# Patient Record
Sex: MALE | Race: BLACK OR AFRICAN AMERICAN | NOT HISPANIC OR LATINO
[De-identification: names, ages, dates, MRNs, and addresses within clinical notes are randomized per-mention and may not be internally consistent; named-entity substitution may affect disease eponyms.]

---

## 2017-03-02 ENCOUNTER — APPOINTMENT (OUTPATIENT)
Dept: INTERNAL MEDICINE | Facility: CLINIC | Age: 52
End: 2017-03-02

## 2017-03-02 VITALS
SYSTOLIC BLOOD PRESSURE: 140 MMHG | WEIGHT: 290 LBS | OXYGEN SATURATION: 97 % | TEMPERATURE: 98.6 F | HEIGHT: 71.26 IN | DIASTOLIC BLOOD PRESSURE: 100 MMHG | BODY MASS INDEX: 40.15 KG/M2 | HEART RATE: 91 BPM

## 2017-03-02 DIAGNOSIS — M16.9 OSTEOARTHRITIS OF HIP, UNSPECIFIED: ICD-10-CM

## 2017-03-02 DIAGNOSIS — R20.2 PARESTHESIA OF SKIN: ICD-10-CM

## 2017-03-02 DIAGNOSIS — R73.03 PREDIABETES.: ICD-10-CM

## 2017-03-02 DIAGNOSIS — Z00.00 ENCOUNTER FOR GENERAL ADULT MEDICAL EXAMINATION W/OUT ABNORMAL FINDINGS: ICD-10-CM

## 2017-03-03 LAB
ALBUMIN SERPL ELPH-MCNC: 4.5 G/DL
ALP BLD-CCNC: 44 U/L
ALT SERPL-CCNC: 26 U/L
ANION GAP SERPL CALC-SCNC: 17 MMOL/L
AST SERPL-CCNC: 23 U/L
BASOPHILS # BLD AUTO: 0.01 K/UL
BASOPHILS NFR BLD AUTO: 0.2 %
BILIRUB SERPL-MCNC: 0.8 MG/DL
BUN SERPL-MCNC: 13 MG/DL
CALCIUM SERPL-MCNC: 10.2 MG/DL
CHLORIDE SERPL-SCNC: 102 MMOL/L
CHOLEST SERPL-MCNC: 193 MG/DL
CHOLEST/HDLC SERPL: 2.9 RATIO
CO2 SERPL-SCNC: 23 MMOL/L
CREAT SERPL-MCNC: 0.89 MG/DL
EOSINOPHIL # BLD AUTO: 0.06 K/UL
EOSINOPHIL NFR BLD AUTO: 1.2 %
GLUCOSE SERPL-MCNC: 97 MG/DL
HBA1C MFR BLD HPLC: 6.2 %
HCT VFR BLD CALC: 44.2 %
HDLC SERPL-MCNC: 66 MG/DL
HGB BLD-MCNC: 15.1 G/DL
IMM GRANULOCYTES NFR BLD AUTO: 0.2 %
LDLC SERPL CALC-MCNC: 102 MG/DL
LYMPHOCYTES # BLD AUTO: 2 K/UL
LYMPHOCYTES NFR BLD AUTO: 40.1 %
MAN DIFF?: NORMAL
MCHC RBC-ENTMCNC: 31.3 PG
MCHC RBC-ENTMCNC: 34.2 GM/DL
MCV RBC AUTO: 91.5 FL
MONOCYTES # BLD AUTO: 0.39 K/UL
MONOCYTES NFR BLD AUTO: 7.8 %
NEUTROPHILS # BLD AUTO: 2.52 K/UL
NEUTROPHILS NFR BLD AUTO: 50.5 %
PLATELET # BLD AUTO: 268 K/UL
POTASSIUM SERPL-SCNC: 4.6 MMOL/L
PROT SERPL-MCNC: 8 G/DL
RBC # BLD: 4.83 M/UL
RBC # FLD: 13.3 %
SODIUM SERPL-SCNC: 142 MMOL/L
TRIGL SERPL-MCNC: 123 MG/DL
VIT B12 SERPL-MCNC: 1052 PG/ML
WBC # FLD AUTO: 4.99 K/UL

## 2017-04-02 ENCOUNTER — FORM ENCOUNTER (OUTPATIENT)
Age: 52
End: 2017-04-02

## 2017-04-03 ENCOUNTER — OUTPATIENT (OUTPATIENT)
Dept: OUTPATIENT SERVICES | Facility: HOSPITAL | Age: 52
LOS: 1 days | End: 2017-04-03
Payer: COMMERCIAL

## 2017-04-03 ENCOUNTER — APPOINTMENT (OUTPATIENT)
Dept: ORTHOPEDIC SURGERY | Facility: CLINIC | Age: 52
End: 2017-04-03

## 2017-04-03 VITALS
SYSTOLIC BLOOD PRESSURE: 110 MMHG | DIASTOLIC BLOOD PRESSURE: 76 MMHG | WEIGHT: 290 LBS | HEIGHT: 71 IN | BODY MASS INDEX: 40.6 KG/M2

## 2017-04-03 DIAGNOSIS — M16.12 UNILATERAL PRIMARY OSTEOARTHRITIS, LEFT HIP: ICD-10-CM

## 2017-04-03 DIAGNOSIS — E78.00 PURE HYPERCHOLESTEROLEMIA, UNSPECIFIED: ICD-10-CM

## 2017-04-03 DIAGNOSIS — Z87.39 PERSONAL HISTORY OF OTHER DISEASES OF THE MUSCULOSKELETAL SYSTEM AND CONNECTIVE TISSUE: ICD-10-CM

## 2017-04-03 PROCEDURE — 72020 X-RAY EXAM OF SPINE 1 VIEW: CPT

## 2017-04-03 PROCEDURE — 73502 X-RAY EXAM HIP UNI 2-3 VIEWS: CPT

## 2017-04-03 PROCEDURE — 73502 X-RAY EXAM HIP UNI 2-3 VIEWS: CPT | Mod: 26,LT

## 2017-04-03 PROCEDURE — 72020 X-RAY EXAM OF SPINE 1 VIEW: CPT | Mod: 26

## 2017-04-03 RX ORDER — MELOXICAM 15 MG/1
15 TABLET ORAL
Qty: 30 | Refills: 1 | Status: ACTIVE | COMMUNITY
Start: 2017-04-03 | End: 1900-01-01

## 2017-04-04 ENCOUNTER — APPOINTMENT (OUTPATIENT)
Dept: GASTROENTEROLOGY | Facility: CLINIC | Age: 52
End: 2017-04-04

## 2017-04-04 VITALS
WEIGHT: 287 LBS | OXYGEN SATURATION: 96 % | TEMPERATURE: 97.9 F | SYSTOLIC BLOOD PRESSURE: 150 MMHG | HEART RATE: 66 BPM | BODY MASS INDEX: 40.18 KG/M2 | DIASTOLIC BLOOD PRESSURE: 100 MMHG | HEIGHT: 71 IN | RESPIRATION RATE: 16 BRPM

## 2017-04-12 ENCOUNTER — APPOINTMENT (OUTPATIENT)
Dept: UROLOGY | Facility: CLINIC | Age: 52
End: 2017-04-12

## 2017-04-12 VITALS
HEART RATE: 80 BPM | HEIGHT: 71 IN | DIASTOLIC BLOOD PRESSURE: 78 MMHG | BODY MASS INDEX: 40.18 KG/M2 | WEIGHT: 287 LBS | SYSTOLIC BLOOD PRESSURE: 134 MMHG | TEMPERATURE: 99 F

## 2017-04-12 DIAGNOSIS — R31.9 HEMATURIA, UNSPECIFIED: ICD-10-CM

## 2017-04-13 LAB
APPEARANCE: ABNORMAL
BACTERIA UR CULT: ABNORMAL
BACTERIA: NEGATIVE
BILIRUBIN URINE: NEGATIVE
BLOOD URINE: NEGATIVE
COLOR: YELLOW
GLUCOSE QUALITATIVE U: NORMAL MG/DL
HYALINE CASTS: 2 /LPF
KETONES URINE: ABNORMAL
LEUKOCYTE ESTERASE URINE: NEGATIVE
MICROSCOPIC-UA: NORMAL
NITRITE URINE: NEGATIVE
PH URINE: 5.5
PROTEIN URINE: NEGATIVE MG/DL
PSA FREE FLD-MCNC: 21 %
PSA FREE SERPL-MCNC: 0.54 NG/ML
PSA SERPL-MCNC: 2.58 NG/ML
RED BLOOD CELLS URINE: 1 /HPF
SPECIFIC GRAVITY URINE: 1.03
SQUAMOUS EPITHELIAL CELLS: 3 /HPF
UROBILINOGEN URINE: 1 MG/DL
WHITE BLOOD CELLS URINE: 2 /HPF

## 2017-05-10 ENCOUNTER — APPOINTMENT (OUTPATIENT)
Dept: UROLOGY | Facility: CLINIC | Age: 52
End: 2017-05-10

## 2017-07-08 ENCOUNTER — EMERGENCY (EMERGENCY)
Facility: HOSPITAL | Age: 52
LOS: 1 days | Discharge: HOME | End: 2017-07-08
Attending: EMERGENCY MEDICINE | Admitting: STUDENT IN AN ORGANIZED HEALTH CARE EDUCATION/TRAINING PROGRAM
Payer: COMMERCIAL

## 2017-07-08 VITALS
RESPIRATION RATE: 17 BRPM | HEART RATE: 82 BPM | SYSTOLIC BLOOD PRESSURE: 153 MMHG | TEMPERATURE: 100 F | OXYGEN SATURATION: 96 % | DIASTOLIC BLOOD PRESSURE: 94 MMHG | WEIGHT: 285.06 LBS | HEIGHT: 72 IN

## 2017-07-08 VITALS
TEMPERATURE: 101 F | DIASTOLIC BLOOD PRESSURE: 83 MMHG | SYSTOLIC BLOOD PRESSURE: 145 MMHG | OXYGEN SATURATION: 97 % | HEART RATE: 84 BPM | RESPIRATION RATE: 18 BRPM

## 2017-07-08 DIAGNOSIS — N12 TUBULO-INTERSTITIAL NEPHRITIS, NOT SPECIFIED AS ACUTE OR CHRONIC: ICD-10-CM

## 2017-07-08 DIAGNOSIS — R30.0 DYSURIA: ICD-10-CM

## 2017-07-08 LAB
ALBUMIN SERPL ELPH-MCNC: 4 G/DL — SIGNIFICANT CHANGE UP (ref 3.3–5)
ALP SERPL-CCNC: 42 U/L — SIGNIFICANT CHANGE UP (ref 40–120)
ALT FLD-CCNC: 20 U/L — SIGNIFICANT CHANGE UP (ref 10–45)
ANION GAP SERPL CALC-SCNC: 12 MMOL/L — SIGNIFICANT CHANGE UP (ref 5–17)
APPEARANCE UR: CLEAR — SIGNIFICANT CHANGE UP
AST SERPL-CCNC: 25 U/L — SIGNIFICANT CHANGE UP (ref 10–40)
BASOPHILS NFR BLD AUTO: 0.1 % — SIGNIFICANT CHANGE UP (ref 0–2)
BILIRUB SERPL-MCNC: 0.8 MG/DL — SIGNIFICANT CHANGE UP (ref 0.2–1.2)
BILIRUB UR-MCNC: NEGATIVE — SIGNIFICANT CHANGE UP
BUN SERPL-MCNC: 9 MG/DL — SIGNIFICANT CHANGE UP (ref 7–23)
CALCIUM SERPL-MCNC: 8.8 MG/DL — SIGNIFICANT CHANGE UP (ref 8.4–10.5)
CHLORIDE SERPL-SCNC: 99 MMOL/L — SIGNIFICANT CHANGE UP (ref 96–108)
CO2 SERPL-SCNC: 25 MMOL/L — SIGNIFICANT CHANGE UP (ref 22–31)
COLOR SPEC: YELLOW — SIGNIFICANT CHANGE UP
CREAT SERPL-MCNC: 0.9 MG/DL — SIGNIFICANT CHANGE UP (ref 0.5–1.3)
DIFF PNL FLD: (no result)
EOSINOPHIL NFR BLD AUTO: 0.4 % — SIGNIFICANT CHANGE UP (ref 0–6)
GLUCOSE SERPL-MCNC: 101 MG/DL — HIGH (ref 70–99)
GLUCOSE UR QL: NEGATIVE — SIGNIFICANT CHANGE UP
HCT VFR BLD CALC: 40.6 % — SIGNIFICANT CHANGE UP (ref 39–50)
HGB BLD-MCNC: 14.6 G/DL — SIGNIFICANT CHANGE UP (ref 13–17)
KETONES UR-MCNC: NEGATIVE — SIGNIFICANT CHANGE UP
LEUKOCYTE ESTERASE UR-ACNC: (no result)
LYMPHOCYTES # BLD AUTO: 18.2 % — SIGNIFICANT CHANGE UP (ref 13–44)
MCHC RBC-ENTMCNC: 31.9 PG — SIGNIFICANT CHANGE UP (ref 27–34)
MCHC RBC-ENTMCNC: 36 G/DL — SIGNIFICANT CHANGE UP (ref 32–36)
MCV RBC AUTO: 88.8 FL — SIGNIFICANT CHANGE UP (ref 80–100)
MONOCYTES NFR BLD AUTO: 10 % — SIGNIFICANT CHANGE UP (ref 2–14)
NEUTROPHILS NFR BLD AUTO: 71.3 % — SIGNIFICANT CHANGE UP (ref 43–77)
NITRITE UR-MCNC: POSITIVE
PH UR: 5.5 — SIGNIFICANT CHANGE UP (ref 5–8)
PLATELET # BLD AUTO: 182 K/UL — SIGNIFICANT CHANGE UP (ref 150–400)
POTASSIUM SERPL-MCNC: 3.9 MMOL/L — SIGNIFICANT CHANGE UP (ref 3.5–5.3)
POTASSIUM SERPL-SCNC: 3.9 MMOL/L — SIGNIFICANT CHANGE UP (ref 3.5–5.3)
PROT SERPL-MCNC: 7.9 G/DL — SIGNIFICANT CHANGE UP (ref 6–8.3)
PROT UR-MCNC: 30 MG/DL
RBC # BLD: 4.57 M/UL — SIGNIFICANT CHANGE UP (ref 4.2–5.8)
RBC # FLD: 12.8 % — SIGNIFICANT CHANGE UP (ref 10.3–16.9)
SODIUM SERPL-SCNC: 136 MMOL/L — SIGNIFICANT CHANGE UP (ref 135–145)
SP GR SPEC: 1.02 — SIGNIFICANT CHANGE UP (ref 1–1.03)
UROBILINOGEN FLD QL: 1 E.U./DL — SIGNIFICANT CHANGE UP
WBC # BLD: 6.8 K/UL — SIGNIFICANT CHANGE UP (ref 3.8–10.5)
WBC # FLD AUTO: 6.8 K/UL — SIGNIFICANT CHANGE UP (ref 3.8–10.5)

## 2017-07-08 PROCEDURE — 87186 SC STD MICRODIL/AGAR DIL: CPT

## 2017-07-08 PROCEDURE — 85025 COMPLETE CBC W/AUTO DIFF WBC: CPT

## 2017-07-08 PROCEDURE — 99284 EMERGENCY DEPT VISIT MOD MDM: CPT

## 2017-07-08 PROCEDURE — 80053 COMPREHEN METABOLIC PANEL: CPT

## 2017-07-08 PROCEDURE — 99284 EMERGENCY DEPT VISIT MOD MDM: CPT | Mod: 25

## 2017-07-08 PROCEDURE — 96374 THER/PROPH/DIAG INJ IV PUSH: CPT

## 2017-07-08 PROCEDURE — 87086 URINE CULTURE/COLONY COUNT: CPT

## 2017-07-08 PROCEDURE — 81001 URINALYSIS AUTO W/SCOPE: CPT

## 2017-07-08 PROCEDURE — 36415 COLL VENOUS BLD VENIPUNCTURE: CPT

## 2017-07-08 PROCEDURE — 96375 TX/PRO/DX INJ NEW DRUG ADDON: CPT

## 2017-07-08 RX ORDER — SODIUM CHLORIDE 9 MG/ML
1000 INJECTION INTRAMUSCULAR; INTRAVENOUS; SUBCUTANEOUS ONCE
Qty: 0 | Refills: 0 | Status: COMPLETED | OUTPATIENT
Start: 2017-07-08 | End: 2017-07-08

## 2017-07-08 RX ORDER — KETOROLAC TROMETHAMINE 30 MG/ML
30 SYRINGE (ML) INJECTION ONCE
Qty: 0 | Refills: 0 | Status: DISCONTINUED | OUTPATIENT
Start: 2017-07-08 | End: 2017-07-08

## 2017-07-08 RX ORDER — CEPHALEXIN 500 MG
1 CAPSULE ORAL
Qty: 20 | Refills: 0 | OUTPATIENT
Start: 2017-07-08 | End: 2017-07-18

## 2017-07-08 RX ORDER — ACETAMINOPHEN 500 MG
650 TABLET ORAL ONCE
Qty: 0 | Refills: 0 | Status: COMPLETED | OUTPATIENT
Start: 2017-07-08 | End: 2017-07-08

## 2017-07-08 RX ORDER — CEFTRIAXONE 500 MG/1
1 INJECTION, POWDER, FOR SOLUTION INTRAMUSCULAR; INTRAVENOUS ONCE
Qty: 0 | Refills: 0 | Status: COMPLETED | OUTPATIENT
Start: 2017-07-08 | End: 2017-07-08

## 2017-07-08 RX ORDER — ONDANSETRON 8 MG/1
4 TABLET, FILM COATED ORAL ONCE
Qty: 0 | Refills: 0 | Status: COMPLETED | OUTPATIENT
Start: 2017-07-08 | End: 2017-07-08

## 2017-07-08 RX ADMIN — SODIUM CHLORIDE 2000 MILLILITER(S): 9 INJECTION INTRAMUSCULAR; INTRAVENOUS; SUBCUTANEOUS at 15:27

## 2017-07-08 RX ADMIN — SODIUM CHLORIDE 2000 MILLILITER(S): 9 INJECTION INTRAMUSCULAR; INTRAVENOUS; SUBCUTANEOUS at 14:11

## 2017-07-08 RX ADMIN — Medication 650 MILLIGRAM(S): at 15:27

## 2017-07-08 RX ADMIN — CEFTRIAXONE 100 GRAM(S): 500 INJECTION, POWDER, FOR SOLUTION INTRAMUSCULAR; INTRAVENOUS at 15:08

## 2017-07-08 RX ADMIN — Medication 30 MILLIGRAM(S): at 16:18

## 2017-07-08 NOTE — ED PROVIDER NOTE - OBJECTIVE STATEMENT
LS 50 yo m w/ pmh htn presents to ed c/o of dysuria, urinary urgency, frequency and hematuria for the past few days.  Pt also reports associated low back pain.  Pt reports only being sexually active with one partner and never having any std's.  Pt otherwise denies abdominal pain, nausea, vomiting, LS 50 yo m w/ pmh htn presents to ed c/o of dysuria, urinary urgency, frequency and hematuria for the past few days.  Pt also reports associated low back pain and nausea.  Pt reports only being sexually active with one partner and never having any std's.  Pt otherwise denies abdominal pain, vomiting, diarrhea LS 50 yo m w/ pmh htn presents to ed c/o of dysuria, urinary urgency, frequency and hematuria for the past few days.  Pt also reports associated low back pain and nausea.  Pt reports only being sexually active with one partner and never having any std's.  Pt otherwise denies abdominal pain, vomiting, diarrhea.

## 2017-07-08 NOTE — ED ADULT NURSE NOTE - OBJECTIVE STATEMENT
Pt CO Hematuria and Lower Back Pain x3 days.  Pt states "This happened a few years ago because I had an infection."  PT denies N/V/D, SOB, Fevers at this time.

## 2017-07-08 NOTE — ED PROVIDER NOTE - ATTENDING CONTRIBUTION TO CARE
52 yo M with hx of HTN presents to ED c/o of dysuria, urgency, frequency and hematuria for the past few days.  (+) nausea.  Sexually active with one partner and no hx of STIs. Denies abdominal pain, vomiting, diarrhea, penile discharge. No fevers/ chills. Pt with hx of UTI with similar sxs in the past. Pt AAO, NAD, RRR, CTA b/l, abd: soft/NT, No CVAT b/l, labs noted. Pt treated for UTI. Rx given.

## 2017-07-08 NOTE — ED PROVIDER NOTE - MEDICAL DECISION MAKING DETAILS
Case d/w Dr. ansari, LS 50 yo m w/ pmh htn presents to ed c/o of dysuria, urinary urgency, frequency and hematuria for the past few days. pt with low back pain rest of exam unremarkable.  U/a + for UTI pt with fever, likely early pyelo.  Pt given IVF's, tylenol, zofran and first dose of ceftriaxone, plan to reasses. Case d/w Dr. ansari, LS 50 yo m w/ pmh htn presents to ed c/o of dysuria, urinary urgency, frequency and hematuria for the past few days. pt with low back pain rest of exam unremarkable.  U/a + for UTI pt with fever, likely early pyelo.  Pt given IVF's, tylenol, zofran and first dose of ceftriaxone, plan to reasses. Pt feeling much improved and stable for discharge at this time

## 2017-07-10 LAB
-  AMPICILLIN/SULBACTAM: SIGNIFICANT CHANGE UP
-  AMPICILLIN: SIGNIFICANT CHANGE UP
-  CEFAZOLIN: SIGNIFICANT CHANGE UP
-  CEFTRIAXONE: SIGNIFICANT CHANGE UP
-  CIPROFLOXACIN: SIGNIFICANT CHANGE UP
-  GENTAMICIN: SIGNIFICANT CHANGE UP
-  NITROFURANTOIN: SIGNIFICANT CHANGE UP
-  PIPERACILLIN/TAZOBACTAM: SIGNIFICANT CHANGE UP
-  TOBRAMYCIN: SIGNIFICANT CHANGE UP
-  TRIMETHOPRIM/SULFAMETHOXAZOLE: SIGNIFICANT CHANGE UP
CULTURE RESULTS: SIGNIFICANT CHANGE UP
METHOD TYPE: SIGNIFICANT CHANGE UP
ORGANISM # SPEC MICROSCOPIC CNT: SIGNIFICANT CHANGE UP
ORGANISM # SPEC MICROSCOPIC CNT: SIGNIFICANT CHANGE UP
SPECIMEN SOURCE: SIGNIFICANT CHANGE UP

## 2018-03-05 ENCOUNTER — APPOINTMENT (OUTPATIENT)
Dept: INTERNAL MEDICINE | Facility: CLINIC | Age: 53
End: 2018-03-05

## 2018-05-14 ENCOUNTER — LABORATORY RESULT (OUTPATIENT)
Age: 53
End: 2018-05-14

## 2018-05-14 ENCOUNTER — APPOINTMENT (OUTPATIENT)
Dept: INTERNAL MEDICINE | Facility: CLINIC | Age: 53
End: 2018-05-14
Payer: MEDICAID

## 2018-05-14 VITALS
HEIGHT: 71 IN | OXYGEN SATURATION: 96 % | TEMPERATURE: 99.1 F | HEART RATE: 71 BPM | BODY MASS INDEX: 39.62 KG/M2 | SYSTOLIC BLOOD PRESSURE: 147 MMHG | WEIGHT: 283 LBS | DIASTOLIC BLOOD PRESSURE: 93 MMHG

## 2018-05-14 DIAGNOSIS — M25.559 PAIN IN UNSPECIFIED HIP: ICD-10-CM

## 2018-05-14 DIAGNOSIS — R31.9 HEMATURIA, UNSPECIFIED: ICD-10-CM

## 2018-05-14 DIAGNOSIS — Z87.898 PERSONAL HISTORY OF OTHER SPECIFIED CONDITIONS: ICD-10-CM

## 2018-05-14 DIAGNOSIS — E78.5 HYPERLIPIDEMIA, UNSPECIFIED: ICD-10-CM

## 2018-05-14 DIAGNOSIS — K21.9 GASTRO-ESOPHAGEAL REFLUX DISEASE W/OUT ESOPHAGITIS: ICD-10-CM

## 2018-05-14 PROCEDURE — 93000 ELECTROCARDIOGRAM COMPLETE: CPT

## 2018-05-14 PROCEDURE — 36415 COLL VENOUS BLD VENIPUNCTURE: CPT

## 2018-05-14 PROCEDURE — 99396 PREV VISIT EST AGE 40-64: CPT | Mod: 25

## 2018-05-14 RX ORDER — SILDENAFIL 100 MG/1
100 TABLET, FILM COATED ORAL
Qty: 9 | Refills: 0 | Status: ACTIVE | COMMUNITY
Start: 2017-04-12 | End: 1900-01-01

## 2018-05-14 RX ORDER — PANTOPRAZOLE 40 MG/1
40 TABLET, DELAYED RELEASE ORAL DAILY
Qty: 90 | Refills: 0 | Status: ACTIVE | COMMUNITY
Start: 2018-05-14 | End: 1900-01-01

## 2018-05-15 LAB
APPEARANCE: ABNORMAL
BACTERIA: NEGATIVE
BILIRUBIN URINE: NEGATIVE
BLOOD URINE: NEGATIVE
COLOR: YELLOW
GLUCOSE QUALITATIVE U: NEGATIVE MG/DL
HYALINE CASTS: 2 /LPF
KETONES URINE: NEGATIVE
LEUKOCYTE ESTERASE URINE: ABNORMAL
MICROSCOPIC-UA: NORMAL
NITRITE URINE: NEGATIVE
PH URINE: 5
PROTEIN URINE: NEGATIVE MG/DL
RED BLOOD CELLS URINE: 2 /HPF
SPECIFIC GRAVITY URINE: 1.03
SQUAMOUS EPITHELIAL CELLS: 3 /HPF
URIC ACID CRYSTALS: ABNORMAL
URINE COMMENTS: NORMAL
UROBILINOGEN URINE: NEGATIVE MG/DL
WHITE BLOOD CELLS URINE: 27 /HPF

## 2018-05-15 RX ORDER — ATORVASTATIN CALCIUM 40 MG/1
40 TABLET, FILM COATED ORAL
Qty: 30 | Refills: 3 | Status: ACTIVE | COMMUNITY
Start: 2018-05-15 | End: 1900-01-01

## 2018-05-15 NOTE — HISTORY OF PRESENT ILLNESS
[de-identified] : Called patient about his abnormal labs.  Patient with elevated lymphocytes, patient confirmed he was getting over a viral illness.  Will repeat in 3 weeks.  Patient will make appointment.  Patient additionally with elevated cholesterol elevated ASCVD 11%.  Started statin.  Told patient A1C downtrending no need for medication.  Patient creatinine normal started lisinopril 5mg.  Patient to follow up in clinic in 3 weeks. Patient for repeat urine studies patient with pyuria.

## 2018-05-16 ENCOUNTER — OTHER (OUTPATIENT)
Age: 53
End: 2018-05-16

## 2018-05-16 LAB
ALBUMIN SERPL ELPH-MCNC: 4.4 G/DL
ALP BLD-CCNC: 36 U/L
ALT SERPL-CCNC: 22 U/L
ANION GAP SERPL CALC-SCNC: 12 MMOL/L
AST SERPL-CCNC: 19 U/L
BASOPHILS # BLD AUTO: 0 K/UL
BASOPHILS NFR BLD AUTO: 0 %
BILIRUB SERPL-MCNC: 0.4 MG/DL
BUN SERPL-MCNC: 14 MG/DL
CALCIUM SERPL-MCNC: 9.6 MG/DL
CHLORIDE SERPL-SCNC: 103 MMOL/L
CHOLEST SERPL-MCNC: 174 MG/DL
CHOLEST/HDLC SERPL: 2.6 RATIO
CO2 SERPL-SCNC: 28 MMOL/L
CREAT SERPL-MCNC: 0.8 MG/DL
CREAT SPEC-SCNC: 261 MG/DL
EOSINOPHIL # BLD AUTO: 0.12 K/UL
EOSINOPHIL NFR BLD AUTO: 3 %
GLUCOSE SERPL-MCNC: 96 MG/DL
HBA1C MFR BLD HPLC: 6 %
HCT VFR BLD CALC: 43.1 %
HDLC SERPL-MCNC: 68 MG/DL
HGB BLD-MCNC: 14.3 G/DL
LDLC SERPL CALC-MCNC: 80 MG/DL
LYMPHOCYTES # BLD AUTO: 2.55 K/UL
LYMPHOCYTES NFR BLD AUTO: 66 %
MAN DIFF?: NORMAL
MCHC RBC-ENTMCNC: 31.4 PG
MCHC RBC-ENTMCNC: 33.2 GM/DL
MCV RBC AUTO: 94.5 FL
MICROALBUMIN 24H UR DL<=1MG/L-MCNC: 1.4 MG/DL
MICROALBUMIN/CREAT 24H UR-RTO: 5 MG/G
MONOCYTES # BLD AUTO: 0.43 K/UL
MONOCYTES NFR BLD AUTO: 11 %
NEUTROPHILS # BLD AUTO: 0.77 K/UL
NEUTROPHILS NFR BLD AUTO: 20 %
PLATELET # BLD AUTO: 235 K/UL
POTASSIUM SERPL-SCNC: 4.5 MMOL/L
PROT SERPL-MCNC: 7.8 G/DL
RBC # BLD: 4.56 M/UL
RBC # FLD: 13.3 %
SODIUM SERPL-SCNC: 143 MMOL/L
TRIGL SERPL-MCNC: 131 MG/DL
VIT B12 SERPL-MCNC: 749 PG/ML
WBC # FLD AUTO: 3.87 K/UL

## 2018-05-30 ENCOUNTER — APPOINTMENT (OUTPATIENT)
Dept: HEART AND VASCULAR | Facility: CLINIC | Age: 53
End: 2018-05-30
Payer: MEDICAID

## 2018-05-30 ENCOUNTER — APPOINTMENT (OUTPATIENT)
Dept: HEART AND VASCULAR | Facility: CLINIC | Age: 53
End: 2018-05-30

## 2018-05-30 VITALS
HEART RATE: 62 BPM | BODY MASS INDEX: 39.2 KG/M2 | DIASTOLIC BLOOD PRESSURE: 90 MMHG | SYSTOLIC BLOOD PRESSURE: 138 MMHG | WEIGHT: 280 LBS | HEIGHT: 71 IN

## 2018-05-30 DIAGNOSIS — I10 ESSENTIAL (PRIMARY) HYPERTENSION: ICD-10-CM

## 2018-05-30 DIAGNOSIS — Z86.39 PERSONAL HISTORY OF OTHER ENDOCRINE, NUTRITIONAL AND METABOLIC DISEASE: ICD-10-CM

## 2018-05-30 DIAGNOSIS — R06.09 OTHER FORMS OF DYSPNEA: ICD-10-CM

## 2018-05-30 PROCEDURE — 93306 TTE W/DOPPLER COMPLETE: CPT

## 2018-05-30 PROCEDURE — 99214 OFFICE O/P EST MOD 30 MIN: CPT | Mod: 25

## 2018-05-30 RX ORDER — LISINOPRIL 10 MG/1
10 TABLET ORAL DAILY
Qty: 90 | Refills: 3 | Status: ACTIVE | COMMUNITY
Start: 2018-05-15 | End: 1900-01-01

## 2018-05-30 RX ORDER — AMLODIPINE BESYLATE 5 MG/1
5 TABLET ORAL DAILY
Qty: 30 | Refills: 0 | Status: COMPLETED | COMMUNITY
Start: 2017-03-02 | End: 2018-05-30

## 2018-06-19 ENCOUNTER — APPOINTMENT (OUTPATIENT)
Dept: HEART AND VASCULAR | Facility: CLINIC | Age: 53
End: 2018-06-19

## 2018-06-26 ENCOUNTER — APPOINTMENT (OUTPATIENT)
Dept: INTERNAL MEDICINE | Facility: CLINIC | Age: 53
End: 2018-06-26
Payer: MEDICAID

## 2018-06-26 VITALS
HEART RATE: 79 BPM | HEIGHT: 71 IN | OXYGEN SATURATION: 97 % | BODY MASS INDEX: 40.6 KG/M2 | DIASTOLIC BLOOD PRESSURE: 99 MMHG | TEMPERATURE: 99 F | WEIGHT: 290 LBS | SYSTOLIC BLOOD PRESSURE: 149 MMHG

## 2018-06-26 DIAGNOSIS — M54.9 DORSALGIA, UNSPECIFIED: ICD-10-CM

## 2018-06-26 DIAGNOSIS — R20.0 ANESTHESIA OF SKIN: ICD-10-CM

## 2018-06-26 DIAGNOSIS — M77.50 OTHER ENTHESOPATHY OF UNSPCFD FOOT AND ANKLE: ICD-10-CM

## 2018-06-26 PROCEDURE — 99213 OFFICE O/P EST LOW 20 MIN: CPT | Mod: GC

## 2018-06-27 ENCOUNTER — APPOINTMENT (OUTPATIENT)
Dept: HEART AND VASCULAR | Facility: CLINIC | Age: 53
End: 2018-06-27

## 2018-08-24 ENCOUNTER — APPOINTMENT (OUTPATIENT)
Dept: INTERNAL MEDICINE | Facility: CLINIC | Age: 53
End: 2018-08-24

## 2018-09-14 ENCOUNTER — APPOINTMENT (OUTPATIENT)
Dept: NEUROLOGY | Facility: CLINIC | Age: 53
End: 2018-09-14

## 2019-01-16 NOTE — ED PROVIDER NOTE - CROS ED ROS STATEMENT
Patient wanted the  referral to rheumatology to be changed to the [place] area.     Patient advised to call his insurance company and see which doctor will be covered and call us   Dr. Freitas's office who referred rheumatology.    Patient verbalized understanding.     all other ROS negative except as per HPI

## 2019-07-16 ENCOUNTER — APPOINTMENT (OUTPATIENT)
Dept: UROLOGY | Facility: CLINIC | Age: 54
End: 2019-07-16

## 2019-07-26 ENCOUNTER — APPOINTMENT (OUTPATIENT)
Dept: UROLOGY | Facility: CLINIC | Age: 54
End: 2019-07-26

## 2019-08-12 ENCOUNTER — APPOINTMENT (OUTPATIENT)
Dept: UROLOGY | Facility: CLINIC | Age: 54
End: 2019-08-12
Payer: MEDICAID

## 2019-08-12 VITALS — TEMPERATURE: 99.2 F | DIASTOLIC BLOOD PRESSURE: 95 MMHG | SYSTOLIC BLOOD PRESSURE: 148 MMHG | HEART RATE: 76 BPM

## 2019-08-12 PROCEDURE — 99213 OFFICE O/P EST LOW 20 MIN: CPT

## 2019-08-13 LAB
APPEARANCE: ABNORMAL
BACTERIA: ABNORMAL
BILIRUBIN URINE: NEGATIVE
BLOOD URINE: NEGATIVE
COLOR: YELLOW
GLUCOSE QUALITATIVE U: NEGATIVE
HYALINE CASTS: 2 /LPF
KETONES URINE: NEGATIVE
LEUKOCYTE ESTERASE URINE: ABNORMAL
MICROSCOPIC-UA: NORMAL
NITRITE URINE: NEGATIVE
PH URINE: 7
PROTEIN URINE: NORMAL
RED BLOOD CELLS URINE: 4 /HPF
SPECIFIC GRAVITY URINE: 1.02
SQUAMOUS EPITHELIAL CELLS: 19 /HPF
UROBILINOGEN URINE: NORMAL
WHITE BLOOD CELLS URINE: 81 /HPF

## 2019-08-13 NOTE — PHYSICAL EXAM
[General Appearance - Well Developed] : well developed [General Appearance - Well Nourished] : well nourished [Well Groomed] : well groomed [Normal Appearance] : normal appearance [General Appearance - In No Acute Distress] : no acute distress [Abdomen Soft] : soft [Abdomen Tenderness] : non-tender [Costovertebral Angle Tenderness] : no ~M costovertebral angle tenderness [Urinary Bladder Findings] : the bladder was normal on palpation [Scrotum] : the scrotum was normal [Epididymis] : the epididymides were normal [Testes Tenderness] : no tenderness of the testes [Testes Mass (___cm)] : there were no testicular masses [FreeTextEntry1] : meatal stenosis with discoloration and fibrosis  [Oriented To Time, Place, And Person] : oriented to person, place, and time [Affect] : the affect was normal [Mood] : the mood was normal [Not Anxious] : not anxious [Normal Station and Gait] : the gait and station were normal for the patient's age [No Focal Deficits] : no focal deficits

## 2019-08-13 NOTE — LETTER BODY
[Dear  ___] : Dear  [unfilled], [Courtesy Letter:] : I had the pleasure of seeing your patient, [unfilled], in my office today. [Please see my note below.] : Please see my note below. [Consult Closing:] : Thank you very much for allowing me to participate in the care of this patient.  If you have any questions, please do not hesitate to contact me. [Sincerely,] : Sincerely, [FreeTextEntry2] : Yoshi Benson MD\par [address], [address]\[place], [address] [FreeTextEntry3] : Esteban Gaston MD\par Urologic Oncology\par Department of Urology\par [facility]\par \par [employer] [facility] \par \par

## 2019-08-13 NOTE — ASSESSMENT
[FreeTextEntry1] : 53yo male with history of gross hematuria, negative workup 2016, new complaint of dysuria, ?recurrent UTI\par Noted on exam today to have meatal stenosis/fibrosis\par Recommend cystoscopy with dilation for further evaluation \par Check renal US for follow up of hematuria and recurrent UTI

## 2019-08-13 NOTE — HISTORY OF PRESENT ILLNESS
[FreeTextEntry1] : This is a 49yo male who reports gross hematuria, weak urinary stream and dysuria starting several weeks ago. Symptoms have been on and off. Pain described as 5/10. History of light smoking. No flank pain, history of stones, nausea or vomiting. \par \par 4/12/17 Here for f/u. Last seen about 1 year ago for gross hematuria, had negative workup. He currently denies any voiding symptoms, no interval hematuria. His primary concern is erectile dysfunction. He has difficulty maintaining erections. He has had no prior treatment. He has several risk factors including hypertension, prediabetes, overweight. \par \par 8/12/19 Here for f/u. Last seen 2 years ago. Reports at least 2 recent UTI associated with dysuria. Took two course of antibiotics. Unclear if urine cultures were positive. Recent PSA = 1.2 [Urinary Frequency] : urinary frequency [Dysuria] : dysuria [None] : None

## 2019-08-14 ENCOUNTER — FORM ENCOUNTER (OUTPATIENT)
Age: 54
End: 2019-08-14

## 2019-08-14 LAB
BACTERIA UR CULT: NORMAL
URINE CYTOLOGY: NORMAL

## 2019-08-15 ENCOUNTER — APPOINTMENT (OUTPATIENT)
Dept: ULTRASOUND IMAGING | Facility: HOSPITAL | Age: 54
End: 2019-08-15
Payer: MEDICAID

## 2019-08-15 ENCOUNTER — OUTPATIENT (OUTPATIENT)
Dept: OUTPATIENT SERVICES | Facility: HOSPITAL | Age: 54
LOS: 1 days | End: 2019-08-15
Payer: COMMERCIAL

## 2019-08-15 PROCEDURE — 76770 US EXAM ABDO BACK WALL COMP: CPT

## 2019-08-15 PROCEDURE — 76770 US EXAM ABDO BACK WALL COMP: CPT | Mod: 26

## 2019-08-28 ENCOUNTER — APPOINTMENT (OUTPATIENT)
Dept: UROLOGY | Facility: CLINIC | Age: 54
End: 2019-08-28
Payer: MEDICAID

## 2019-08-28 VITALS — TEMPERATURE: 98.8 F | DIASTOLIC BLOOD PRESSURE: 80 MMHG | HEART RATE: 76 BPM | SYSTOLIC BLOOD PRESSURE: 123 MMHG

## 2019-08-28 DIAGNOSIS — N36.9 URETHRAL DISORDER, UNSPECIFIED: ICD-10-CM

## 2019-08-28 DIAGNOSIS — R31.0 GROSS HEMATURIA: ICD-10-CM

## 2019-08-28 PROCEDURE — 53600 DILATE URETHRA STRICTURE: CPT

## 2019-08-29 LAB
ALBUMIN SERPL ELPH-MCNC: 4.4 G/DL
ALP BLD-CCNC: 38 U/L
ALT SERPL-CCNC: 16 U/L
ANION GAP SERPL CALC-SCNC: 13 MMOL/L
APPEARANCE: CLEAR
APTT BLD: 34.2 SEC
AST SERPL-CCNC: 16 U/L
BACTERIA: NEGATIVE
BASOPHILS # BLD AUTO: 0.03 K/UL
BASOPHILS NFR BLD AUTO: 0.5 %
BILIRUB SERPL-MCNC: 0.2 MG/DL
BILIRUBIN URINE: NEGATIVE
BLOOD URINE: ABNORMAL
BUN SERPL-MCNC: 15 MG/DL
CALCIUM SERPL-MCNC: 9.8 MG/DL
CHLORIDE SERPL-SCNC: 99 MMOL/L
CO2 SERPL-SCNC: 27 MMOL/L
COLOR: YELLOW
CREAT SERPL-MCNC: 1.07 MG/DL
EOSINOPHIL # BLD AUTO: 0.07 K/UL
EOSINOPHIL NFR BLD AUTO: 1.1 %
GLUCOSE QUALITATIVE U: NEGATIVE
GLUCOSE SERPL-MCNC: 106 MG/DL
HCT VFR BLD CALC: 41.7 %
HGB BLD-MCNC: 13.7 G/DL
HYALINE CASTS: 4 /LPF
IMM GRANULOCYTES NFR BLD AUTO: 0.2 %
INR PPP: 1 RATIO
KETONES URINE: NEGATIVE
LEUKOCYTE ESTERASE URINE: ABNORMAL
LYMPHOCYTES # BLD AUTO: 2.39 K/UL
LYMPHOCYTES NFR BLD AUTO: 38.6 %
MAN DIFF?: NORMAL
MCHC RBC-ENTMCNC: 30.5 PG
MCHC RBC-ENTMCNC: 32.9 GM/DL
MCV RBC AUTO: 92.9 FL
MICROSCOPIC-UA: NORMAL
MONOCYTES # BLD AUTO: 0.63 K/UL
MONOCYTES NFR BLD AUTO: 10.2 %
NEUTROPHILS # BLD AUTO: 3.06 K/UL
NEUTROPHILS NFR BLD AUTO: 49.4 %
NITRITE URINE: NEGATIVE
PH URINE: 6
PLATELET # BLD AUTO: 255 K/UL
POTASSIUM SERPL-SCNC: 3.8 MMOL/L
PROT SERPL-MCNC: 7.4 G/DL
PROTEIN URINE: NEGATIVE
PT BLD: 11.5 SEC
RBC # BLD: 4.49 M/UL
RBC # FLD: 13.1 %
RED BLOOD CELLS URINE: 2 /HPF
SODIUM SERPL-SCNC: 139 MMOL/L
SPECIFIC GRAVITY URINE: 1.02
SQUAMOUS EPITHELIAL CELLS: 3 /HPF
UROBILINOGEN URINE: NORMAL
WBC # FLD AUTO: 6.19 K/UL
WHITE BLOOD CELLS URINE: 26 /HPF

## 2019-08-30 LAB — BACTERIA UR CULT: ABNORMAL

## 2019-09-05 ENCOUNTER — FORM ENCOUNTER (OUTPATIENT)
Age: 54
End: 2019-09-05

## 2019-09-06 ENCOUNTER — OUTPATIENT (OUTPATIENT)
Dept: OUTPATIENT SERVICES | Facility: HOSPITAL | Age: 54
LOS: 1 days | End: 2019-09-06
Payer: COMMERCIAL

## 2019-09-06 PROCEDURE — 71046 X-RAY EXAM CHEST 2 VIEWS: CPT | Mod: 26

## 2019-09-06 PROCEDURE — 71046 X-RAY EXAM CHEST 2 VIEWS: CPT

## 2019-09-07 LAB
APPEARANCE: ABNORMAL
BACTERIA UR CULT: NORMAL
BACTERIA: ABNORMAL
BILIRUBIN URINE: NEGATIVE
BLOOD URINE: NEGATIVE
COLOR: YELLOW
GLUCOSE QUALITATIVE U: NEGATIVE
KETONES URINE: NEGATIVE
LEUKOCYTE ESTERASE URINE: NEGATIVE
MICROSCOPIC-UA: NORMAL
NITRITE URINE: NEGATIVE
PH URINE: 5.5
PROTEIN URINE: NORMAL
RED BLOOD CELLS URINE: 1 /HPF
SPECIFIC GRAVITY URINE: >=1.03
SQUAMOUS EPITHELIAL CELLS: 1 /HPF
URINE COMMENTS: NORMAL
UROBILINOGEN URINE: NORMAL
WHITE BLOOD CELLS URINE: 1 /HPF

## 2019-09-10 ENCOUNTER — RESULT REVIEW (OUTPATIENT)
Age: 54
End: 2019-09-10

## 2019-09-10 ENCOUNTER — OUTPATIENT (OUTPATIENT)
Dept: OUTPATIENT SERVICES | Facility: HOSPITAL | Age: 54
LOS: 1 days | Discharge: HOME | End: 2019-09-10
Payer: MEDICAID

## 2019-09-10 ENCOUNTER — APPOINTMENT (OUTPATIENT)
Dept: UROLOGY | Facility: AMBULATORY SURGERY CENTER | Age: 54
End: 2019-09-10

## 2019-09-10 DIAGNOSIS — N35.919 UNSPECIFIED URETHRAL STRICTURE, MALE, UNSPECIFIED SITE: ICD-10-CM

## 2019-09-10 PROCEDURE — 52281 CYSTOSCOPY AND TREATMENT: CPT

## 2019-09-10 RX ORDER — CEPHALEXIN 500 MG
1 CAPSULE ORAL
Qty: 10 | Refills: 0
Start: 2019-09-10 | End: 2019-09-14

## 2019-09-11 PROBLEM — N35.919 URETHRAL MEATAL STENOSIS: Status: ACTIVE | Noted: 2019-09-11

## 2019-09-12 LAB
-  AMIKACIN: SIGNIFICANT CHANGE UP
-  AMPICILLIN/SULBACTAM: SIGNIFICANT CHANGE UP
-  AMPICILLIN: SIGNIFICANT CHANGE UP
-  CEFAZOLIN: SIGNIFICANT CHANGE UP
-  CEFTRIAXONE: SIGNIFICANT CHANGE UP
-  CIPROFLOXACIN: SIGNIFICANT CHANGE UP
-  GENTAMICIN: SIGNIFICANT CHANGE UP
-  MEROPENEM: SIGNIFICANT CHANGE UP
-  PIPERACILLIN/TAZOBACTAM: SIGNIFICANT CHANGE UP
-  TOBRAMYCIN: SIGNIFICANT CHANGE UP
-  TRIMETHOPRIM/SULFAMETHOXAZOLE: SIGNIFICANT CHANGE UP
METHOD TYPE: SIGNIFICANT CHANGE UP
SURGICAL PATHOLOGY STUDY: SIGNIFICANT CHANGE UP

## 2019-09-13 LAB
-  MEROPENEM: SIGNIFICANT CHANGE UP
CULTURE RESULTS: SIGNIFICANT CHANGE UP
METHOD TYPE: SIGNIFICANT CHANGE UP
ORGANISM # SPEC MICROSCOPIC CNT: SIGNIFICANT CHANGE UP
SPECIMEN SOURCE: SIGNIFICANT CHANGE UP

## 2019-09-23 ENCOUNTER — APPOINTMENT (OUTPATIENT)
Dept: UROLOGY | Facility: CLINIC | Age: 54
End: 2019-09-23
Payer: MEDICAID

## 2019-09-23 VITALS — DIASTOLIC BLOOD PRESSURE: 79 MMHG | HEART RATE: 71 BPM | SYSTOLIC BLOOD PRESSURE: 125 MMHG | TEMPERATURE: 98.6 F

## 2019-09-23 DIAGNOSIS — N45.1 EPIDIDYMITIS: ICD-10-CM

## 2019-09-23 PROCEDURE — 99213 OFFICE O/P EST LOW 20 MIN: CPT

## 2019-09-23 RX ORDER — IBUPROFEN 800 MG/1
800 TABLET, FILM COATED ORAL EVERY 8 HOURS
Qty: 30 | Refills: 0 | Status: ACTIVE | COMMUNITY
Start: 2019-09-23 | End: 1900-01-01

## 2019-09-23 NOTE — HISTORY OF PRESENT ILLNESS
[FreeTextEntry1] : This is a 51yo male who reports gross hematuria, weak urinary stream and dysuria starting several weeks ago. Symptoms have been on and off. Pain described as 5/10. History of light smoking. No flank pain, history of stones, nausea or vomiting. \par \par 4/12/17 Here for f/u. Last seen about 1 year ago for gross hematuria, had negative workup. He currently denies any voiding symptoms, no interval hematuria. His primary concern is erectile dysfunction. He has difficulty maintaining erections. He has had no prior treatment. He has several risk factors including hypertension, prediabetes, overweight. \par \par 8/12/19 Here for f/u. Last seen 2 years ago. Reports at least 2 recent UTI associated with dysuria. Took two course of antibiotics. Unclear if urine cultures were positive. Recent PSA = 1.2\par \par 9/23/19 Here for f/u. Underwent meatoplasty, cystoscopy 9/10. Path: BXO. Culture from OR grew MDR resistant organism. He developed mild left epididymitis postop.  [Moderate] : moderate in severity [5] : 5 [de-identified] : left testicle

## 2019-09-23 NOTE — ASSESSMENT
[FreeTextEntry1] : 55yo male with history of gross hematuria, negative workup 2016, new complaint of dysuria, ?recurrent UTI\par Noted on exam today to have meatal stenosis/fibrosis\par s/p meatoplasty 9/10\par Path reviewed: BXO\par Postop epididymitis. H/o MDR organism. Will repeat culture\par Will treat empirically with oral antibiotic for now but may need IV antibiotics\par F/u 3 weeks

## 2019-09-23 NOTE — LETTER BODY
[Dear  ___] : Dear  [unfilled], [Courtesy Letter:] : I had the pleasure of seeing your patient, [unfilled], in my office today. [Please see my note below.] : Please see my note below. [Consult Closing:] : Thank you very much for allowing me to participate in the care of this patient.  If you have any questions, please do not hesitate to contact me. [Sincerely,] : Sincerely, [FreeTextEntry3] : Esteban Gaston MD\par Urologic Oncology\par Department of Urology\par [facility]\par \par [employer] [facility] \par \par  [FreeTextEntry2] : Yoshi Benson MD\par [address], [address]\[place], [address]

## 2019-09-23 NOTE — PHYSICAL EXAM
[General Appearance - Well Developed] : well developed [General Appearance - Well Nourished] : well nourished [Well Groomed] : well groomed [Normal Appearance] : normal appearance [General Appearance - In No Acute Distress] : no acute distress [Abdomen Soft] : soft [Abdomen Tenderness] : non-tender [Costovertebral Angle Tenderness] : no ~M costovertebral angle tenderness [Urinary Bladder Findings] : the bladder was normal on palpation [FreeTextEntry1] : meatus is wide open, well healed. Left testicle tender with induration [Oriented To Time, Place, And Person] : oriented to person, place, and time [Affect] : the affect was normal [Mood] : the mood was normal [Not Anxious] : not anxious [Normal Station and Gait] : the gait and station were normal for the patient's age [No Focal Deficits] : no focal deficits

## 2019-09-25 ENCOUNTER — MESSAGE (OUTPATIENT)
Age: 54
End: 2019-09-25

## 2019-09-25 ENCOUNTER — OTHER (OUTPATIENT)
Age: 54
End: 2019-09-25

## 2019-09-25 LAB — BACTERIA UR CULT: ABNORMAL

## 2019-09-25 RX ORDER — NITROFURANTOIN MACROCRYSTALS 100 MG/1
100 CAPSULE ORAL
Qty: 20 | Refills: 0 | Status: ACTIVE | COMMUNITY
Start: 2019-09-25 | End: 1900-01-01

## 2019-10-14 ENCOUNTER — APPOINTMENT (OUTPATIENT)
Dept: UROLOGY | Facility: CLINIC | Age: 54
End: 2019-10-14
Payer: MEDICAID

## 2019-10-14 VITALS — TEMPERATURE: 99.9 F | DIASTOLIC BLOOD PRESSURE: 68 MMHG | SYSTOLIC BLOOD PRESSURE: 109 MMHG | HEART RATE: 90 BPM

## 2019-10-14 PROCEDURE — 99213 OFFICE O/P EST LOW 20 MIN: CPT

## 2019-10-14 RX ORDER — SULFAMETHOXAZOLE AND TRIMETHOPRIM 800; 160 MG/1; MG/1
800-160 TABLET ORAL
Qty: 28 | Refills: 0 | Status: DISCONTINUED | COMMUNITY
Start: 2019-09-23 | End: 2019-10-14

## 2019-10-14 RX ORDER — CEPHALEXIN 500 MG/1
500 CAPSULE ORAL
Qty: 10 | Refills: 0 | Status: DISCONTINUED | COMMUNITY
Start: 2019-09-10 | End: 2019-10-14

## 2019-10-14 RX ORDER — AMOXICILLIN AND CLAVULANATE POTASSIUM 875; 125 MG/1; MG/1
875-125 TABLET, COATED ORAL
Qty: 14 | Refills: 0 | Status: DISCONTINUED | COMMUNITY
Start: 2019-08-30 | End: 2019-10-14

## 2019-10-14 NOTE — PHYSICAL EXAM
[General Appearance - Well Developed] : well developed [General Appearance - Well Nourished] : well nourished [Normal Appearance] : normal appearance [General Appearance - In No Acute Distress] : no acute distress [Well Groomed] : well groomed [Abdomen Soft] : soft [Abdomen Tenderness] : non-tender [Costovertebral Angle Tenderness] : no ~M costovertebral angle tenderness [Urinary Bladder Findings] : the bladder was normal on palpation [FreeTextEntry1] : meatus is wide open, well healed. Left testicle with mild residual induration, significantly improved [Oriented To Time, Place, And Person] : oriented to person, place, and time [Affect] : the affect was normal [Mood] : the mood was normal [Not Anxious] : not anxious [Normal Station and Gait] : the gait and station were normal for the patient's age [No Focal Deficits] : no focal deficits

## 2019-10-14 NOTE — LETTER BODY
[Dear  ___] : Dear  [unfilled], [Courtesy Letter:] : I had the pleasure of seeing your patient, [unfilled], in my office today. [Please see my note below.] : Please see my note below. [Consult Closing:] : Thank you very much for allowing me to participate in the care of this patient.  If you have any questions, please do not hesitate to contact me. [Sincerely,] : Sincerely, [FreeTextEntry2] : Yoshi Benson MD\par [address] [facility], [address]\[place], [address] [FreeTextEntry3] : Esteban Gaston MD\par Urologic Oncology\par Department of Urology\par [facility]\par \par [employer] [facility] \par \par

## 2019-10-14 NOTE — HISTORY OF PRESENT ILLNESS
[FreeTextEntry1] : This is a 51yo male who reports gross hematuria, weak urinary stream and dysuria starting several weeks ago. Symptoms have been on and off. Pain described as 5/10. History of light smoking. No flank pain, history of stones, nausea or vomiting. \par \par 4/12/17 Here for f/u. Last seen about 1 year ago for gross hematuria, had negative workup. He currently denies any voiding symptoms, no interval hematuria. His primary concern is erectile dysfunction. He has difficulty maintaining erections. He has had no prior treatment. He has several risk factors including hypertension, prediabetes, overweight. \par \par 8/12/19 Here for f/u. Last seen 2 years ago. Reports at least 2 recent UTI associated with dysuria. Took two course of antibiotics. Unclear if urine cultures were positive. Recent PSA = 1.2\par \par 9/23/19 Here for f/u. Underwent meatoplasty, cystoscopy 9/10. Path: BXO. Culture from OR grew MDR resistant organism. He developed mild left epididymitis postop. \par \par 10/14/19 Here for f/u. Epididymitis has resolved, feeling much better. Flow is good.  [None] : None

## 2019-10-14 NOTE — ASSESSMENT
[FreeTextEntry1] : 55yo male with history of gross hematuria, negative workup 2016, new complaint of dysuria, ?recurrent UTI\par Noted on exam today to have meatal stenosis/fibrosis\par s/p meatoplasty 9/10\par Path reviewed: BXO\par Postop epididymitis. H/o MDR organism. No asymptomatic after completing antibiotics\par F/u 2 months

## 2019-11-01 ENCOUNTER — APPOINTMENT (OUTPATIENT)
Dept: UROLOGY | Facility: CLINIC | Age: 54
End: 2019-11-01

## 2019-12-11 ENCOUNTER — APPOINTMENT (OUTPATIENT)
Dept: UROLOGY | Facility: CLINIC | Age: 54
End: 2019-12-11
Payer: MEDICAID

## 2019-12-11 VITALS — SYSTOLIC BLOOD PRESSURE: 144 MMHG | DIASTOLIC BLOOD PRESSURE: 96 MMHG | TEMPERATURE: 98.9 F | HEART RATE: 73 BPM

## 2019-12-11 DIAGNOSIS — N39.0 URINARY TRACT INFECTION, SITE NOT SPECIFIED: ICD-10-CM

## 2019-12-11 PROCEDURE — 99213 OFFICE O/P EST LOW 20 MIN: CPT

## 2019-12-11 NOTE — LETTER BODY
[Dear  ___] : Dear  [unfilled], [Courtesy Letter:] : I had the pleasure of seeing your patient, [unfilled], in my office today. [Please see my note below.] : Please see my note below. [Consult Closing:] : Thank you very much for allowing me to participate in the care of this patient.  If you have any questions, please do not hesitate to contact me. [Sincerely,] : Sincerely, [FreeTextEntry2] : Yoshi Benson MD\par [address], [address]\[place], [address] [FreeTextEntry3] : Esteban Gaston MD\par Urologic Oncology\par Department of Urology\par [facility]\par \par [employer] [facility] \par \par

## 2019-12-11 NOTE — ASSESSMENT
[FreeTextEntry1] : 53yo male with history of gross hematuria, negative workup 2016, new complaint of dysuria, ?recurrent UTI\par meatal stenosis/fibrosis s/p meatoplasty 9/10\par Path reviewed: BXO\par Postop epididymitis. Now resolved\par F/u 3 months

## 2019-12-11 NOTE — HISTORY OF PRESENT ILLNESS
[FreeTextEntry1] : This is a 49yo male who reports gross hematuria, weak urinary stream and dysuria starting several weeks ago. Symptoms have been on and off. Pain described as 5/10. History of light smoking. No flank pain, history of stones, nausea or vomiting. \par \par 4/12/17 Here for f/u. Last seen about 1 year ago for gross hematuria, had negative workup. He currently denies any voiding symptoms, no interval hematuria. His primary concern is erectile dysfunction. He has difficulty maintaining erections. He has had no prior treatment. He has several risk factors including hypertension, prediabetes, overweight. \par \par 8/12/19 Here for f/u. Last seen 2 years ago. Reports at least 2 recent UTI associated with dysuria. Took two course of antibiotics. Unclear if urine cultures were positive. Recent PSA = 1.2\par \par 9/23/19 Here for f/u. Underwent meatoplasty, cystoscopy 9/10. Path: BXO. Culture from OR grew MDR resistant organism. He developed mild left epididymitis postop. \par \par 10/14/19 Here for f/u. Epididymitis has resolved, feeling much better. Flow is good. \par \par 12/11/19 Here for f/u. Doing better. Flow is good.  [None] : None

## 2019-12-11 NOTE — PHYSICAL EXAM
[General Appearance - Well Nourished] : well nourished [General Appearance - Well Developed] : well developed [Well Groomed] : well groomed [Normal Appearance] : normal appearance [Abdomen Tenderness] : non-tender [General Appearance - In No Acute Distress] : no acute distress [Abdomen Soft] : soft [Costovertebral Angle Tenderness] : no ~M costovertebral angle tenderness [FreeTextEntry1] : meatus is wide open, well healed. Epididymitis resolved [Oriented To Time, Place, And Person] : oriented to person, place, and time [Urinary Bladder Findings] : the bladder was normal on palpation [Not Anxious] : not anxious [Mood] : the mood was normal [Affect] : the affect was normal [Normal Station and Gait] : the gait and station were normal for the patient's age [No Focal Deficits] : no focal deficits

## 2020-03-09 ENCOUNTER — APPOINTMENT (OUTPATIENT)
Dept: UROLOGY | Facility: CLINIC | Age: 55
End: 2020-03-09

## 2020-04-09 PROBLEM — M25.559 HIP PAIN: Status: ACTIVE | Noted: 2018-05-14

## 2020-06-24 ENCOUNTER — APPOINTMENT (OUTPATIENT)
Dept: UROLOGY | Facility: CLINIC | Age: 55
End: 2020-06-24

## 2020-08-31 ENCOUNTER — APPOINTMENT (OUTPATIENT)
Dept: UROLOGY | Facility: CLINIC | Age: 55
End: 2020-08-31
Payer: COMMERCIAL

## 2020-08-31 VITALS — HEART RATE: 60 BPM | SYSTOLIC BLOOD PRESSURE: 144 MMHG | TEMPERATURE: 98.1 F | DIASTOLIC BLOOD PRESSURE: 94 MMHG

## 2020-08-31 DIAGNOSIS — N52.9 MALE ERECTILE DYSFUNCTION, UNSPECIFIED: ICD-10-CM

## 2020-08-31 DIAGNOSIS — N48.0 LEUKOPLAKIA OF PENIS: ICD-10-CM

## 2020-08-31 PROCEDURE — 53600 DILATE URETHRA STRICTURE: CPT

## 2020-08-31 PROCEDURE — 99213 OFFICE O/P EST LOW 20 MIN: CPT | Mod: 25

## 2020-08-31 RX ORDER — SILDENAFIL 50 MG/1
50 TABLET ORAL
Qty: 6 | Refills: 3 | Status: ACTIVE | COMMUNITY
Start: 2020-08-31 | End: 1900-01-01

## 2020-09-01 NOTE — LETTER BODY
[Dear  ___] : Dear  [unfilled], [Courtesy Letter:] : I had the pleasure of seeing your patient, [unfilled], in my office today. [Please see my note below.] : Please see my note below. [Consult Closing:] : Thank you very much for allowing me to participate in the care of this patient.  If you have any questions, please do not hesitate to contact me. [Sincerely,] : Sincerely, [FreeTextEntry2] : Yoshi Benson MD\par [address]\[place], [address] [FreeTextEntry3] : Esteban Gaston MD\par Urologic Oncology\par Department of Urology\par [facility]\par \par [employer] [facility] \par \par

## 2020-09-01 NOTE — PHYSICAL EXAM
[General Appearance - Well Developed] : well developed [General Appearance - Well Nourished] : well nourished [Normal Appearance] : normal appearance [Well Groomed] : well groomed [General Appearance - In No Acute Distress] : no acute distress [Abdomen Soft] : soft [Abdomen Tenderness] : non-tender [Costovertebral Angle Tenderness] : no ~M costovertebral angle tenderness [Urinary Bladder Findings] : the bladder was normal on palpation [FreeTextEntry1] : Meatus catheterized with 16Fr and 18Fr catheters to dilate [Oriented To Time, Place, And Person] : oriented to person, place, and time [Affect] : the affect was normal [Mood] : the mood was normal [Not Anxious] : not anxious [Normal Station and Gait] : the gait and station were normal for the patient's age [No Focal Deficits] : no focal deficits

## 2020-09-01 NOTE — ASSESSMENT
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

## 2020-09-01 NOTE — HISTORY OF PRESENT ILLNESS
[FreeTextEntry1] : This is a 51yo male who reports gross hematuria, weak urinary stream and dysuria starting several weeks ago. Symptoms have been on and off. Pain described as 5/10. History of light smoking. No flank pain, history of stones, nausea or vomiting. \par \par 4/12/17 Here for f/u. Last seen about 1 year ago for gross hematuria, had negative workup. He currently denies any voiding symptoms, no interval hematuria. His primary concern is erectile dysfunction. He has difficulty maintaining erections. He has had no prior treatment. He has several risk factors including hypertension, prediabetes, overweight. \par \par 8/12/19 Here for f/u. Last seen 2 years ago. Reports at least 2 recent UTI associated with dysuria. Took two course of antibiotics. Unclear if urine cultures were positive. Recent PSA = 1.2\par \par 9/23/19 Here for f/u. Underwent meatoplasty, cystoscopy 9/10. Path: BXO. Culture from OR grew MDR resistant organism. He developed mild left epididymitis postop. \par \par 10/14/19 Here for f/u. Epididymitis has resolved, feeling much better. Flow is good. \par \par 12/11/19 Here for f/u. Doing better. Flow is good. \par \par 8/31/20 Here for f/u. Reports some weak stream, sometimes misdirected. Interested in treatment for ED. [Weak Stream] : weak stream [Erectile Dysfunction] : Erectile Dysfunction [None] : None

## 2024-01-04 NOTE — ED PROVIDER NOTE - CHIEF COMPLAINT
The patient is a 51y Male complaining of urinary symptoms. Render Risk Assessment In Note?: no Detail Level: Detailed Note Text (......Xxx Chief Complaint.): This diagnosis correlates with the Other (Free Text): The patient has previously treated the areas with 5-FU cream without response.

## 2024-06-17 ENCOUNTER — APPOINTMENT (OUTPATIENT)
Dept: PHYSICAL MEDICINE AND REHAB | Facility: CLINIC | Age: 59
End: 2024-06-17
Payer: COMMERCIAL

## 2024-06-17 ENCOUNTER — OUTPATIENT (OUTPATIENT)
Dept: OUTPATIENT SERVICES | Facility: HOSPITAL | Age: 59
LOS: 1 days | End: 2024-06-17
Payer: COMMERCIAL

## 2024-06-17 ENCOUNTER — RESULT REVIEW (OUTPATIENT)
Age: 59
End: 2024-06-17

## 2024-06-17 VITALS
DIASTOLIC BLOOD PRESSURE: 80 MMHG | HEIGHT: 71 IN | TEMPERATURE: 98.1 F | BODY MASS INDEX: 40.6 KG/M2 | OXYGEN SATURATION: 97 % | WEIGHT: 290 LBS | HEART RATE: 69 BPM | SYSTOLIC BLOOD PRESSURE: 120 MMHG

## 2024-06-17 DIAGNOSIS — G83.4 CAUDA EQUINA SYNDROME: ICD-10-CM

## 2024-06-17 DIAGNOSIS — M12.552 TRAUMATIC ARTHROPATHY, LEFT HIP: ICD-10-CM

## 2024-06-17 DIAGNOSIS — M47.26 OTHER SPONDYLOSIS WITH RADICULOPATHY, LUMBAR REGION: ICD-10-CM

## 2024-06-17 DIAGNOSIS — M54.17 RADICULOPATHY, LUMBOSACRAL REGION: ICD-10-CM

## 2024-06-17 DIAGNOSIS — M79.2 NEURALGIA AND NEURITIS, UNSPECIFIED: ICD-10-CM

## 2024-06-17 DIAGNOSIS — M67.959 UNSPECIFIED DISORDER OF SYNOVIUM AND TENDON, UNSPECIFIED THIGH: ICD-10-CM

## 2024-06-17 DIAGNOSIS — R26.9 UNSPECIFIED ABNORMALITIES OF GAIT AND MOBILITY: ICD-10-CM

## 2024-06-17 DIAGNOSIS — Z98.890 OTHER SPECIFIED POSTPROCEDURAL STATES: ICD-10-CM

## 2024-06-17 PROCEDURE — 73522 X-RAY EXAM HIPS BI 3-4 VIEWS: CPT

## 2024-06-17 PROCEDURE — 72110 X-RAY EXAM L-2 SPINE 4/>VWS: CPT

## 2024-06-17 PROCEDURE — G2211 COMPLEX E/M VISIT ADD ON: CPT | Mod: NC

## 2024-06-17 PROCEDURE — 99205 OFFICE O/P NEW HI 60 MIN: CPT

## 2024-06-17 PROCEDURE — 72110 X-RAY EXAM L-2 SPINE 4/>VWS: CPT | Mod: 26

## 2024-06-17 PROCEDURE — 73522 X-RAY EXAM HIPS BI 3-4 VIEWS: CPT | Mod: 26

## 2024-06-17 RX ORDER — GABAPENTIN 100 MG/1
100 CAPSULE ORAL
Qty: 90 | Refills: 0 | Status: ACTIVE | COMMUNITY
Start: 2024-06-17 | End: 1900-01-01

## 2024-06-17 RX ORDER — METHYLPREDNISOLONE 4 MG/1
4 TABLET ORAL
Qty: 1 | Refills: 0 | Status: ACTIVE | COMMUNITY
Start: 2024-06-17 | End: 1900-01-01

## 2024-06-17 NOTE — HISTORY OF PRESENT ILLNESS
[FreeTextEntry1] : Anmol Sanchez M.D. Sports Medicine and Interventional Spine Department of Physical Medicine and Rehabilitation [facility] Orth[facility] [address] [facility], [address] [place] [address]   [facility] [place] [facility] [address], [address] [place] [address]   For [place] Appointments Phone: [phone] Fax: [phone]   ----------------------------------------------------------------------------------------------------------------------------------------   PATIENT: MIGUEL SCHWAB MRN: [id number] YOB: 1965 DATE OF SERVICE: 06/17/2024 Jun 17, 2024     Dear Drs.   Thank you for referring MIGUEL SCHWAB to my Sports and Interventional Spine practice and office. Enclosed is a copy of the patient's consultation/progress note, which includes my complete assessment and recent studies completed during the patient's evaluation.   If you have questions or have any patients who require nonsurgical, non-opiate management of any sports, spine, or musculoskeletal conditions, please do not hesitate to contact my  at [phone].   I look forward to taking care of your patients along with you.   Sincerely,   Anmol Sanchze MD Sports, Interventional Spine, & Regenerative Musculoskeletal Medicine [facility] [place] [facility]                                                       Initial Consultation: CC: post lumbar surgery   HPI:  This is the first visit to [facility] [place] [facility] Sports Medicine and Interventional Spine Practice.   MIGUEL SCHWAB presents with the chief complaint as above.   Initial Hx on 06/17/2024 : Presents in person to [facility], referred by Dr. Heath The patients difficulties began years ago patient had lumbar surgical intervention, at [facility], records not accessible at today's visit The pain is graded as 8/10 up to 10/10 points to the axial low back, points to the left hip, left GTB region The pain is described as sharp sometimes The pain is constant,  The pain radiates in the LEFT LOWER limbs in a L5, S1 distribution  The patient feels that the pain is overall persistent Patient denies other recent fall, MVA, injury, trauma, or accident besides presenting history above   Aggravating: prolonged ambulation, prolonged sitting, prolonged standing, navigating stairs, getting out of bed, sit to stand transitional movements Alleviating: rest, activity modification, pharmacologic treatments as per intake and as above   Meds: denies regular PO pain medications; ibuprofen 400-600mg, tylenol 1000mg  Therapy Program: no recent structured targeted therapy program HEP: doing HEP regularly Injection Hx: denies locally directed treatment to the area in question, non since prior to 2018 Imaging Hx: reviewed   Assoc Sx: Reports intermittent numbness, tingling paresthesia in the left limbs in a L5, S1 distribution Otherwise denies numbness, Tingling   Denies Focal motor weakness in the upper or lower limbs Denies New or worsened bowel or bladder incontinence Denies Saddle anesthesia Denies Using Orthotic(s)/Supportive devices Denies Swelling in the upper/lower extremities They also deny frequent tripping, falling   ROS: A 14 point review of systems was completed. Positive findings are pain as described above. The remaining systems negative.   Prostate Hx: up to date COVID HX: reviewed   Assoc Hx: Ambulates with assistive device 50% of the time Level of functioning: AD with ambulation, indep with ADLs Living Situation: walk up apartment dwelling with steps to enter

## 2024-06-17 NOTE — ASSESSMENT
[FreeTextEntry1] :                                                       Assessment/Plan:   MIGUEL SCHWAB is a 58 year male with left hip, left leg pain here for initial consultation.   R/O cauda equina syndrome [including bladder incontinence] Status post lumbar fusion Osteoarthritis of spine with radiculopathy, lumbar region Neuropathic pain of lower extremity, right Paresthesia of leg, right  History of rheumatoid arthritis H/O GI Bleed [discussed Jun 17, 2024 probably hemorrhoids] History of Closed fracture of neck of femur, LEFT  - Tiers of treatment and management of above diagnosis(es) were discussed with patient - Optimal diet, weight, sleep, and lifestyle management to minimize stress and maximize well being counseling provided - Imaging reviewed and discussed with patient - Reviewed previous encounter notes from 6/26/2018 Dr. WILL Quintana (Internal Medicine) - Patient was advised to start a structured, targeted therapy program 2-3x/wk for 8 wks with goal toward HEP AFTER MRI - Patient was educated on an appropriate home exercise program, provided with exercise recommendations, all questions answered - Jun 17, 2024: Patient was advised to start gabapentin for their neuropathic pain symptoms. Patient was instructed to begin with 100mg at bedtime for the next week. If well tolerated, patient will double their nightly dose to 200mg at bedtime. After another week, if the medication is well tolerated, they will commence 300mg at bedtime. Patient provided with written instructions as well. All questions were answered and the patient displayed a clear understanding of the plan of care, including titration of gabapentin. The patient was informed about not taking this medication at the same time as any sleeping or muscle relaxant pills. Pt was also notified to stop, and contact our office, if it is too sedating, ankle swelling occurs and/or they experience suicidal thinking. - Patient may trial acetaminophen 1000mg up to TID PRN moderate to severe pain and to decrease average consumption of NSAIDs - Patient was advised to apply cool compresses or warm heat to affected regions PRN - Radiographs of lumbosacral region, hips ordered 06/17/2024   - Jun 17, 2024:  Patient was prescribed medrol dose pack (x1) with written instructions, all questions answered, informed of side effects of the medication.  Possible side effects, including hyperglycemia, GI upset, and GI bleed, reviewed with patient. In agreement with patient that potential pain reduction and anti-inflammatory benefits currently outweigh known side effect profile for oral corticosteroids. Patient instructed to immediately stop medication should she develop any abdominal pain, nausea, vomiting, bloody stools, or BRBPR  - Educated about red flag symptoms including (but not limited to) new, worsened, or persistent: fever greater than 100F, bowel or bladder incontinence, bowel obstipation, inability to void urine, urinary leakage, Severe nausea or vomiting, Worsening numbness, worsening tingling/paresthesias, and/or new or progressive motor weakness; advised to seek immediate medical attention at his nearest Emergency department should they experience any of the above   - Patient relates having minimal interest in locally directed treatment of their condition at this time, they were counseled on the role for local treatment as part of the tiers of treatment for their condition, all questions answered   - 06/17/2024 stat MRI lumbar spine without contrast is indicated given that the pt has not improved with tylenol, ibuprofen, naproxen, meloxicam, they underwent non-diagnostic radiographic imaging of the region Jun 17, 2024 progressively worsening radicular left leg pain, persistent now progressive urinary incontinence with marked loss of lower limb ROM due to axial low back pain, remote traumatic crush injury to the low back requirinng surgical intervention, necessary for local treatment/surgical planning , and physical therapy/home exercise program>6 weeks. Patient's imaging is medically necessary to outline targets for locally (interventional) directed treatments and/or guide surgical management.   - Follow up in 2-3 weeks after imaging AFTER MRI   I have personally spent a total of at least 65 minutes preparing, reviewing internal and external records, explaining, counseling, providing necessary information via documented paperwork for this encounter, and coordinating care for this patient encounter.   Thank you, (s), for allowing me to participate in the care of your patient. Please do not hesitate to contact me with questions/concerns.   Anmol Sanchez M.D. Sports and Interventional Spine Department of Physical Medicine and Rehabilitation [facility] Physician [facility] [address], [address] [place] [address]   Appointments: [phone] Fax: [phone]

## 2024-06-17 NOTE — PHYSICAL EXAM
[FreeTextEntry1] : Gen: A+O x 3 in NAD Psych: Normal mood and affect. Responds appropriately to commands Eyes: Anicteric. No discharge. EOMI. Resp: Breathing unlabored CV: DP pulses 2+ and equal. No varicosities noted Ext: No c/c/e Skin: No lesions noted   Gait: ++ antalgic limited  reciprocating heel to toe unable to stand on toes and heels WITH hand holding/both hands on counter-top unable to Tandem gait WITH hand holding Poor single leg standing balance, B/L Romberg negative   Trendelenburg present with LEFT > RIGHT stance leg   Inspection: Spine alignment is midline. Palpation: There is + tenderness over the midline spinous processes, paravertebral muscles of the thoracolumbar region   Lumbar ROM: Flexion, extension, side-bending, rotation, profoundly limited in most planes +pain with lateral flexion +pain with oblique extension +pain with lateral rotation all radiating into left lowe rlimb   Hip ROM: ++ pain at terminal ROM LEFT. FAIR, FABERE negative bilaterally   + weakness with resisted external rotation of the hip flexed to 90, knee flexed to 90, and hip externally rotated 20 degrees RIGHT (gluteus medius) + marked weakness with resisted external rotation of the hip flexed to 90, knee flexed to 90, and hip externally rotated 20 degrees LEFT (gluteus medius)     TEST REGION      Hip Flex   Knee Ext   Ankle Dorsi  EHL   Ankle Plantar Strength Right Side  5/5         5/5                  5/5           4/5              5/5                         Strength Left Side.    3-4/5         4/5                  3-4/5           3-4/5              3-4/5                           Hip abduction R 4/5 L 4-/5 Hip adduction R 4/5 L 4-/5 Hip extension R 4/5 L 4-/5 Knee Flexion R 4/5 L 4-/5   unable to perform 10 calf raises on the left unable to perform 10 calf raises on the right   Tone: Normal. No clonus. Sensation: Grossly intact to light touch bilateral lower limbs. Proprioception: Intact at big toes bilaterally. Reflexes: 1+ symmetric knee jerk, ankle jerk. Plantars absent bilaterally.   SLR + LEFT Crossed SLR + right leg movement, left leg pain. Slump TEST + left   deferred prone gapping test deferred yeoman deferred nachlas B/L active hip extension was more difficult on LEFT standing

## 2024-06-28 ENCOUNTER — NON-APPOINTMENT (OUTPATIENT)
Age: 59
End: 2024-06-28

## 2024-07-01 ENCOUNTER — APPOINTMENT (OUTPATIENT)
Dept: UROLOGY | Facility: CLINIC | Age: 59
End: 2024-07-01

## 2024-07-11 ENCOUNTER — NON-APPOINTMENT (OUTPATIENT)
Age: 59
End: 2024-07-11

## 2024-07-12 ENCOUNTER — APPOINTMENT (OUTPATIENT)
Dept: UROLOGY | Facility: CLINIC | Age: 59
End: 2024-07-12
Payer: COMMERCIAL

## 2024-07-12 ENCOUNTER — NON-APPOINTMENT (OUTPATIENT)
Age: 59
End: 2024-07-12

## 2024-07-12 VITALS
TEMPERATURE: 92.6 F | HEART RATE: 56 BPM | WEIGHT: 290 LBS | HEIGHT: 71 IN | BODY MASS INDEX: 40.6 KG/M2 | SYSTOLIC BLOOD PRESSURE: 123 MMHG | DIASTOLIC BLOOD PRESSURE: 72 MMHG

## 2024-07-12 DIAGNOSIS — R31.0 GROSS HEMATURIA: ICD-10-CM

## 2024-07-12 PROCEDURE — G2211 COMPLEX E/M VISIT ADD ON: CPT | Mod: NC

## 2024-07-12 PROCEDURE — 99204 OFFICE O/P NEW MOD 45 MIN: CPT

## 2024-07-12 PROCEDURE — 51798 US URINE CAPACITY MEASURE: CPT

## 2024-07-12 RX ORDER — TADALAFIL 5 MG/1
5 TABLET ORAL
Qty: 90 | Refills: 3 | Status: ACTIVE | COMMUNITY
Start: 2024-07-12 | End: 1900-01-01

## 2024-07-16 PROBLEM — N39.0 URINARY TRACT INFECTION WITHOUT HEMATURIA, SITE UNSPECIFIED: Status: ACTIVE | Noted: 2024-07-16 | Resolved: 2024-08-15

## 2024-07-16 LAB
APPEARANCE: ABNORMAL
BACTERIA UR CULT: ABNORMAL
BACTERIA: ABNORMAL /HPF
BILIRUBIN URINE: ABNORMAL
BLOOD URINE: ABNORMAL
C TRACH RRNA SPEC QL NAA+PROBE: NOT DETECTED
CAST: NORMAL /LPF
COLOR: NORMAL
EPITHELIAL CELLS: 2 /HPF
GLUCOSE QUALITATIVE U: NEGATIVE MG/DL
KETONES URINE: ABNORMAL MG/DL
LEUKOCYTE ESTERASE URINE: ABNORMAL
M GENITALIUM DNA UR QL NAA+PROBE: NEGATIVE
M HOMINIS DNA SPEC QL NAA+PROBE: NEGATIVE
MICROSCOPIC-UA: NORMAL
MUCUS: PRESENT
N GONORRHOEA RRNA SPEC QL NAA+PROBE: NOT DETECTED
NITRITE URINE: POSITIVE
PH URINE: 5.5
PROTEIN URINE: 30 MG/DL
RED BLOOD CELLS URINE: 3 /HPF
REVIEW: NORMAL
SOURCE AMPLIFICATION: NORMAL
SOURCE AMPLIFICATION: NORMAL
SPECIFIC GRAVITY URINE: >1.03
T VAGINALIS RRNA SPEC QL NAA+PROBE: NOT DETECTED
UREAPLASMA DNA SPEC QL NAA+PROBE: POSITIVE
UROBILINOGEN URINE: 1 MG/DL
WHITE BLOOD CELLS URINE: 238 /HPF

## 2024-07-21 PROBLEM — R30.0 DYSURIA: Status: ACTIVE | Noted: 2024-07-12

## 2024-07-21 PROBLEM — R39.9 LOWER URINARY TRACT SYMPTOMS (LUTS): Status: ACTIVE | Noted: 2024-07-12

## 2024-07-21 NOTE — LETTER BODY
[Dear  ___] : Dear  [unfilled], [Courtesy Letter:] : I had the pleasure of seeing your patient, [unfilled], in my office today. [Please see my note below.] : Please see my note below. [Consult Closing:] : Thank you very much for allowing me to participate in the care of this patient.  If you have any questions, please do not hesitate to contact me. [Sincerely,] : Sincerely, [FreeTextEntry2] : Yoshi Benson MD  [address] [facility], [address]  [place], [address] [FreeTextEntry3] : Alan Tate MD

## 2024-07-21 NOTE — PHYSICAL EXAM
[Normal Appearance] : normal appearance [Well Groomed] : well groomed [General Appearance - In No Acute Distress] : no acute distress [Edema] : no peripheral edema [Exaggerated Use Of Accessory Muscles For Inspiration] : no accessory muscle use [Respiration, Rhythm And Depth] : normal respiratory rhythm and effort [Abdomen Soft] : soft [Abdomen Tenderness] : non-tender [Costovertebral Angle Tenderness] : no ~M costovertebral angle tenderness [Urinary Bladder Findings] : the bladder was normal on palpation [Normal Station and Gait] : the gait and station were normal for the patient's age [] : no rash [No Focal Deficits] : no focal deficits [Oriented To Time, Place, And Person] : oriented to person, place, and time [Affect] : the affect was normal [Mood] : the mood was normal [de-identified] : BXO of glans, narrow meatus with clear narrowing of the distal urethra

## 2024-07-21 NOTE — HISTORY OF PRESENT ILLNESS
[FreeTextEntry1] : 7/12/24: 58 year old man presents with long history of dysuria and LUTS.  He has previously been evaluated by Dr. 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 He had meatoplasty in 2019, and does not feel that it helped much with his dysuria. Has tried antibiotics in the past with no improvement.  He has increasing LUTS with more frequency, urgency and nocturia. Nocturia disrupting sleep. Not taking any prostate medications, has not tried in the past.  He had recent episode of gross hematuria. He has had in the past as well, with prior evaluation including cystoscopy in 2016 that was unremarkable and CT with only small nephrolithaisis. He reports recent CT imaging that was performed.   He does have difficulty with erections.   PVR 8 cc  7/22/24: Returns to discuss test results. Urine culture came back with >100 K ESBL E coli. Bactrim was sent to patient's pharmacy. Ureaplasma subsequently returned positive as well.

## 2024-07-22 ENCOUNTER — APPOINTMENT (OUTPATIENT)
Dept: UROLOGY | Facility: CLINIC | Age: 59
End: 2024-07-22

## 2024-07-22 DIAGNOSIS — R39.9 UNSPECIFIED SYMPTOMS AND SIGNS INVOLVING THE GENITOURINARY SYSTEM: ICD-10-CM

## 2024-07-26 ENCOUNTER — APPOINTMENT (OUTPATIENT)
Dept: UROLOGY | Facility: CLINIC | Age: 59
End: 2024-07-26
Payer: COMMERCIAL

## 2024-07-26 VITALS
DIASTOLIC BLOOD PRESSURE: 73 MMHG | WEIGHT: 250 LBS | HEIGHT: 71 IN | TEMPERATURE: 94.5 F | SYSTOLIC BLOOD PRESSURE: 122 MMHG | BODY MASS INDEX: 35 KG/M2 | HEART RATE: 67 BPM

## 2024-07-26 DIAGNOSIS — R30.0 DYSURIA: ICD-10-CM

## 2024-07-26 DIAGNOSIS — N39.0 URINARY TRACT INFECTION, SITE NOT SPECIFIED: ICD-10-CM

## 2024-07-26 DIAGNOSIS — A49.3 MYCOPLASMA INFECTION, UNSPECIFIED SITE: ICD-10-CM

## 2024-07-26 PROCEDURE — G2211 COMPLEX E/M VISIT ADD ON: CPT | Mod: NC

## 2024-07-26 PROCEDURE — 99214 OFFICE O/P EST MOD 30 MIN: CPT

## 2024-07-26 RX ORDER — SULFAMETHOXAZOLE AND TRIMETHOPRIM 800; 160 MG/1; MG/1
800-160 TABLET ORAL
Qty: 28 | Refills: 0 | Status: ACTIVE | COMMUNITY
Start: 2024-07-16 | End: 1900-01-01

## 2024-07-26 RX ORDER — DOXYCYCLINE HYCLATE 100 MG/1
100 TABLET ORAL
Qty: 28 | Refills: 0 | Status: ACTIVE | COMMUNITY
Start: 2024-07-26 | End: 1900-01-01

## 2024-08-07 ENCOUNTER — NON-APPOINTMENT (OUTPATIENT)
Age: 59
End: 2024-08-07

## 2024-08-20 ENCOUNTER — NON-APPOINTMENT (OUTPATIENT)
Age: 59
End: 2024-08-20

## 2024-08-22 ENCOUNTER — NON-APPOINTMENT (OUTPATIENT)
Age: 59
End: 2024-08-22

## 2024-08-23 ENCOUNTER — APPOINTMENT (OUTPATIENT)
Dept: UROLOGY | Facility: CLINIC | Age: 59
End: 2024-08-23

## 2024-08-26 ENCOUNTER — APPOINTMENT (OUTPATIENT)
Dept: UROLOGY | Facility: CLINIC | Age: 59
End: 2024-08-26
Payer: COMMERCIAL

## 2024-08-26 VITALS
TEMPERATURE: 98.1 F | WEIGHT: 250 LBS | BODY MASS INDEX: 35 KG/M2 | DIASTOLIC BLOOD PRESSURE: 71 MMHG | HEART RATE: 65 BPM | SYSTOLIC BLOOD PRESSURE: 126 MMHG | HEIGHT: 71 IN

## 2024-08-26 DIAGNOSIS — A49.3 MYCOPLASMA INFECTION, UNSPECIFIED SITE: ICD-10-CM

## 2024-08-26 DIAGNOSIS — R39.9 UNSPECIFIED SYMPTOMS AND SIGNS INVOLVING THE GENITOURINARY SYSTEM: ICD-10-CM

## 2024-08-26 DIAGNOSIS — R31.0 GROSS HEMATURIA: ICD-10-CM

## 2024-08-26 DIAGNOSIS — N39.0 URINARY TRACT INFECTION, SITE NOT SPECIFIED: ICD-10-CM

## 2024-08-26 DIAGNOSIS — N52.9 MALE ERECTILE DYSFUNCTION, UNSPECIFIED: ICD-10-CM

## 2024-08-26 PROCEDURE — 99214 OFFICE O/P EST MOD 30 MIN: CPT

## 2024-08-26 NOTE — PHYSICAL EXAM
[Normal Appearance] : normal appearance [Well Groomed] : well groomed [General Appearance - In No Acute Distress] : no acute distress [Edema] : no peripheral edema [Respiration, Rhythm And Depth] : normal respiratory rhythm and effort [Exaggerated Use Of Accessory Muscles For Inspiration] : no accessory muscle use [Abdomen Soft] : soft [Abdomen Tenderness] : non-tender [Costovertebral Angle Tenderness] : no ~M costovertebral angle tenderness [Urinary Bladder Findings] : the bladder was normal on palpation [Normal Station and Gait] : the gait and station were normal for the patient's age [] : no rash [No Focal Deficits] : no focal deficits [Oriented To Time, Place, And Person] : oriented to person, place, and time [Affect] : the affect was normal [Mood] : the mood was normal [de-identified] : BXO of glans, narrow meatus with clear narrowing of the distal urethra

## 2024-08-26 NOTE — HISTORY OF PRESENT ILLNESS
[FreeTextEntry1] : 7/12/24: 58 year old man presents with long history of dysuria and LUTS.  He has previously been evaluated by Dr. 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 He had meatoplasty in 2019, and does not feel that it helped much with his dysuria. Has tried antibiotics in the past with no improvement.  He has increasing LUTS with more frequency, urgency and nocturia. Nocturia disrupting sleep. Not taking any prostate medications, has not tried in the past.  He had recent episode of gross hematuria. He has had in the past as well, with prior evaluation including cystoscopy in 2016 that was unremarkable and CT with only small nephrolithaisis. He reports recent CT imaging that was performed.   He does have difficulty with erections.   PVR 8 cc  7/26/24: Returns to discuss test results. Urine culture came back with >100 K ESBL E coli. Bactrim was sent to patient's pharmacy. Ureaplasma subsequently returned positive as well. Has not started any antibiotic, having similar symptoms.  8/26/24: Completed bactrim for E coli and doxycycline for ureaplasma infection. Feels symptoms have improved somewhat, though still having dysuria. Taking tadalafil for LUTS and ED.

## 2024-08-26 NOTE — LETTER BODY
[Dear  ___] : Dear  [unfilled], [Courtesy Letter:] : I had the pleasure of seeing your patient, [unfilled], in my office today. [Please see my note below.] : Please see my note below. [Consult Closing:] : Thank you very much for allowing me to participate in the care of this patient.  If you have any questions, please do not hesitate to contact me. [Sincerely,] : Sincerely, [FreeTextEntry2] : Yoshi Benson MD  [address] [facility], [address]  [place], [address] [FreeTextEntry3] : Alan Tate MD

## 2024-08-26 NOTE — ASSESSMENT
[FreeTextEntry1] : 58 year old man with long history of dysuria and LUTS, BXO with meatal stenosis s/p urethroplasty in 2019, recurrent gross hematuria with negative evaluation in 2016.  7/12/24: Will plan for repeat evaluation given distant prior evaluation. Will perform infectious workup for dysuria. Possible UTI, STD, urethral stricture, prostatitis. LUTS possibly related to infection, prostatitis, BPH, overactive bladder, urethral stricture. Evaluation for gross hematuria. He had recent CT that was reportedly unremarkable, will try to get report and imaging. He will need cystoscopy to complete evaluation. Most likely source of all symptoms is recurrent stricture and narrowing of the urethra.   7/22/24: Infectious workup showed >100K ESBL E coli and Ureaplasma infection. Will need treatment with Bactrim and doxycycline.   8/26/24: Completed antibiotics and taking tadalafil with moderate improvement. Still with some dysuria. Will repeat infectious workup.   - UA, UCx   - Continue tadalafil 5 mg daily

## 2024-09-05 LAB
APPEARANCE: CLEAR
BACTERIA UR CULT: ABNORMAL
BACTERIA: ABNORMAL /HPF
BILIRUBIN URINE: NEGATIVE
BLOOD URINE: NEGATIVE
CAST: 1 /LPF
COLOR: YELLOW
EPITHELIAL CELLS: 6 /HPF
GLUCOSE QUALITATIVE U: NEGATIVE MG/DL
KETONES URINE: NEGATIVE MG/DL
LEUKOCYTE ESTERASE URINE: ABNORMAL
MICROSCOPIC-UA: NORMAL
NITRITE URINE: POSITIVE
PH URINE: 5.5
PROTEIN URINE: NEGATIVE MG/DL
RED BLOOD CELLS URINE: 1 /HPF
SPECIFIC GRAVITY URINE: 1.03
UROBILINOGEN URINE: 0.2 MG/DL
WHITE BLOOD CELLS URINE: 49 /HPF

## 2024-09-05 RX ORDER — CIPROFLOXACIN HYDROCHLORIDE 500 MG/1
500 TABLET, FILM COATED ORAL
Qty: 14 | Refills: 0 | Status: ACTIVE | COMMUNITY
Start: 2024-09-05 | End: 1900-01-01

## 2024-09-19 ENCOUNTER — NON-APPOINTMENT (OUTPATIENT)
Age: 59
End: 2024-09-19

## 2024-09-27 ENCOUNTER — APPOINTMENT (OUTPATIENT)
Dept: PHYSICAL MEDICINE AND REHAB | Facility: CLINIC | Age: 59
End: 2024-09-27

## 2024-09-27 VITALS
HEART RATE: 72 BPM | HEIGHT: 71 IN | WEIGHT: 250 LBS | DIASTOLIC BLOOD PRESSURE: 78 MMHG | SYSTOLIC BLOOD PRESSURE: 121 MMHG | OXYGEN SATURATION: 97 % | BODY MASS INDEX: 35 KG/M2

## 2024-09-27 DIAGNOSIS — R26.9 UNSPECIFIED ABNORMALITIES OF GAIT AND MOBILITY: ICD-10-CM

## 2024-09-27 DIAGNOSIS — M54.17 RADICULOPATHY, LUMBOSACRAL REGION: ICD-10-CM

## 2024-09-27 DIAGNOSIS — M47.26 OTHER SPONDYLOSIS WITH RADICULOPATHY, LUMBAR REGION: ICD-10-CM

## 2024-09-27 DIAGNOSIS — M12.552 TRAUMATIC ARTHROPATHY, LEFT HIP: ICD-10-CM

## 2024-09-27 DIAGNOSIS — Z98.890 OTHER SPECIFIED POSTPROCEDURAL STATES: ICD-10-CM

## 2024-09-27 DIAGNOSIS — G83.4 CAUDA EQUINA SYNDROME: ICD-10-CM

## 2024-09-27 PROCEDURE — G2211 COMPLEX E/M VISIT ADD ON: CPT | Mod: NC

## 2024-09-27 PROCEDURE — 99215 OFFICE O/P EST HI 40 MIN: CPT

## 2024-09-27 NOTE — HISTORY OF PRESENT ILLNESS
[FreeTextEntry1] : Anmol Sanchez M.D. Sports Medicine and Spine Department of Physical Medicine and Rehabilitation [facility] [address] [place] [address]   [facility] [place] [facility] [address] [place] [address]   For [place] Appointments Phone: [phone] Fax: [phone]   ----------------------------------------------------------------------------------------------------------------------------------------   PATIENT: MIGUEL SCHWAB MRN: [id number] YOB: 1965 DATE OF SERVICE: Sep 27, 2024    Sep 27, 2024     Dear Drs.   Thank you for referring MIGUEL SCHWAB to my Sports and Spine practice and office. Enclosed is a copy of the patient's consultation/progress note, which includes my complete assessment and recent studies completed during the patient's evaluation.   If you have questions or have any patients who require nonsurgical, non-opiate management of any sports, spine, or musculoskeletal conditions, please do not hesitate to contact my  at [phone].   I look forward to taking care of your patients along with you.   Sincerely,   Anmol Sanchez MD Sports, Spine, & Regenerative Musculoskeletal Medicine [facility]                                                       Follow Up Visit CC: post lumbar surgery   HPI:  This is a follow up visit to [facility] Sports Medicine and Interventional Spine Practice.   MIGUEL SCHWAB presents with the chief complaint as above.  Interval Hx on Sep 27, 2024: presents for follow up. At the last visit, we advised MRI lumbar spine, starting gabapentin, starting physical therapy program.  Since the last visit, patient has been experiencing worsening 8/10 pain low back, left posterior hip, radiating into the left lower leg. patient notes that due to pain he is unable to attend their work duties. There have been no significant changes to their aggravating or alleviating factors since the last visit. Pharmacologic treatments now include OTC analgesics PRN, but otherwise pharmacologic treatments are minimal. Denies new or worsened numbness, tingling, or focal motor deficit. Denies interval fall, accident, or injury. Denies change in bowel or bladder functioning. pain from lumbar region to the GT region then "shoots down." has been affecting his ability to ambulate.   Initial Hx on 06/17/2024: Presents in person to [facility], referred by Dr. Heath. The patients difficulties began years ago. patient had lumbar surgical intervention, at [facility], records not accessible at today's visit. The pain is graded as 8/10 up to 10/10. points to the axial low back, points to the left hip, left GTB region. The pain is described as sharp sometimes. The pain is constant. The pain radiates in the LEFT LOWER limbs in a L5, S1 distribution. The patient feels that the pain is overall persistent. Patient denies other recent fall, MVA, injury, trauma, or accident besides presenting history above. Aggravating: prolonged ambulation, prolonged sitting, prolonged standing, navigating stairs, getting out of bed, sit to stand transitional movements. Alleviating: rest, activity modification, pharmacologic treatments as per intake and as above   Meds: denies regular PO pain medications; ibuprofen 400-600mg, tylenol 1000mg  Therapy Program: no recent structured targeted therapy program HEP: doing HEP regularly Injection Hx: denies locally directed treatment to the area in question, non since prior to 2018 Imaging Hx: reviewed   Assoc Sx: Reports intermittent numbness, tingling paresthesia in the left limbs in a L5, S1 distribution Otherwise denies numbness, Tingling   Denies Focal motor weakness in the upper or lower limbs Denies New or worsened bowel or bladder incontinence Denies Saddle anesthesia Denies Using Orthotic(s)/Supportive devices Denies Swelling in the upper/lower extremities They also deny frequent tripping, falling   ROS: A 14 point review of systems was completed. Positive findings are pain as described above. The remaining systems negative.   Prostate Hx: up to date COVID HX: reviewed   Assoc Hx: Ambulates with assistive device 50% of the time Level of functioning: AD with ambulation, indep with ADLs Living Situation: walk up apartment dwelling with steps to enter

## 2024-09-27 NOTE — ASSESSMENT
[FreeTextEntry1] :                                                       Assessment/Plan:   MIGUEL SCHWAB is a 58 year male with left hip, left leg pain here for follow up   Advanced osteoarthritis of hip, left  History of Cauda equina syndrome (344.60) (G83.4) H/O Spinal surgery (V45.89) (Z98.890) Neurologic gait dysfunction (781.2) (R26.9) Osteoarthritis of spine with radiculopathy, lumbar region (721.3) (M47.26) Radiculitis, lumbosacral (724.4) (M54.17) Traumatic arthritis of left hip (716.15) (M12.552)  Status post lumbar laminectomy (2010) Osteoarthritis of spine with radiculopathy, lumbar region Neuropathic pain of lower extremity, right Paresthesia of leg, right  History of rheumatoid arthritis H/O GI Bleed [discussed Jun 17, 2024 probably hemorrhoids] History of Closed fracture of neck of femur, LEFT  - Tiers of treatment and management of above diagnosis(es) were discussed with patient - Optimal diet, weight, sleep, and lifestyle management to minimize stress and maximize well being counseling provided - Imaging reviewed and discussed with patient - Reviewed previous encounter notes from 6/26/2018 Dr. WILL Quintana (Internal Medicine) - Patient was advised to start a structured, targeted therapy program 2-3x/wk for 8 wks with goal toward HEP AFTER MRI - Patient was educated on an appropriate home exercise program, provided with exercise recommendations, all questions answered - Jun 17, 2024: Patient was advised to start gabapentin for their neuropathic pain symptoms. Patient was instructed to begin with 100mg at bedtime for the next week. If well tolerated, patient will double their nightly dose to 200mg at bedtime. After another week, if the medication is well tolerated, they will commence 300mg at bedtime. Patient provided with written instructions as well. All questions were answered and the patient displayed a clear understanding of the plan of care, including titration of gabapentin. The patient was informed about not taking this medication at the same time as any sleeping or muscle relaxant pills. Pt was also notified to stop, and contact our office, if it is too sedating, ankle swelling occurs and/or they experience suicidal thinking. - Patient may trial acetaminophen 1000mg up to TID PRN moderate to severe pain and to decrease average consumption of NSAIDs - Patient was advised to apply cool compresses or warm heat to affected regions PRN - Sep 27, 2024 referral to (Orthopedic Surgery Joint Reconstruction for left hip evaluation - Sep 27, 2024 referral to Anesthesia/Pain for evaluation for left TFESI - Radiographs of lumbosacral region, hips ordered 06/17/2024, reviewed including Sep 27, 2024  - Sep 27, 2024 provided with note for work due to acute exacerbation he would need to avoid all work related activity 9/26/2024 and 9/27/2024   - Jun 17, 2024:  Patient was prescribed medrol dose pack (x1) with written instructions, all questions answered, informed of side effects of the medication.  Possible side effects, including hyperglycemia, GI upset, and GI bleed, reviewed with patient. In agreement with patient that potential pain reduction and anti-inflammatory benefits currently outweigh known side effect profile for oral corticosteroids. Patient instructed to immediately stop medication should she develop any abdominal pain, nausea, vomiting, bloody stools, or BRBPR  - Educated about red flag symptoms including (but not limited to) new, worsened, or persistent: fever greater than 100F, bowel or bladder incontinence, bowel obstipation, inability to void urine, urinary leakage, Severe nausea or vomiting, Worsening numbness, worsening tingling/paresthesias, and/or new or progressive motor weakness; advised to seek immediate medical attention at his nearest Emergency department should they experience any of the above   - Patient relates having minimal interest in locally directed treatment of their condition at this time, they were counseled on the role for local treatment as part of the tiers of treatment for their condition, all questions answered    - Follow up in 2-3 weeks after imaging AFTER MRI, AFTER starting PT;    I have personally spent a total of at least 45 minutes preparing, reviewing internal and external records, explaining, counseling, providing necessary information via documented paperwork for this encounter, and coordinating care for this patient encounter.   Thank you, Dr(s), for allowing me to participate in the care of your patient. Please do not hesitate to contact me with questions/concerns.   Anmol Sanchez M.D. Sports and Spine Department of Physical Medicine and Rehabilitation [facility] [address] [place] [address]   Appointments: [phone] Fax: [phone]

## 2024-09-27 NOTE — DATA REVIEWED
[FreeTextEntry1] : ACC: [id number] EXAM: CT LUMBAR SPINE ORDERED BY: INDERJIT AFTABGABE PROCEDURE DATE: 08/22/2024 INTERPRETATION: CLINICAL INFORMATION: severe lower back pain with left leg tingling COMPARISON: Lumbar spine radiographs from 6/17/2024. CONTRAST: IV Contrast: None Complications: N/A TECHNIQUE: Serial axial images were obtained of the lumbar spine using multi-slice helical technique. Reformatted coronal and sagittal images were obtained. FINDINGS: VERTEBRAE: Normal in height. No acute fracture. Multilevel degenerative changes including facet arthropathy greatest at L4-5 and small marginal osteophytes. ALIGNMENT: Stable grade 1 anterolisthesis at L4-5. T12-L1: No significant disc bulge or focal disc herniation. No significant spinal canal stenosis or neuroforaminal narrowing. L1-L2: No significant disc bulge or focal disc herniation. No significant spinal canal stenosis or neuroforaminal narrowing. L2-L3: No significant disc bulge or focal disc herniation. No significant spinal canal stenosis or neuroforaminal narrowing. L3-L4: Mild diffuse disc bulge. No significant spinal canal stenosis or neuroforaminal narrowing. L4-L5: Grade 1 anterolisthesis of L4 on L5 with uncovering of the posterior disc. No significant spinal canal stenosis. Facet hypertrophy and uncovertebral spurring contributing to mild left neuroforaminal narrowing. L5-S1: Redemonstrated moderate degenerative disc disease. Mild diffuse disc bulge. No significant spinal canal stenosis. Facet hypertrophy and uncovertebral spurring contributing to moderate right and mild left neuroforaminal narrowing. MISCELLANEOUS: Partially imaged marked left hip osteoarthrosis and anterior pelvic wall mesh coils. Nonobstructive punctate right lower pole renal stone. Colonic diverticulosis. IMPRESSION: Multi-level degenerative changes as noted above, most significant for moderate right neural foraminal narrowing at L5-S1. No significant canal stenosis.

## 2024-10-18 ENCOUNTER — APPOINTMENT (OUTPATIENT)
Dept: ORTHOPEDIC SURGERY | Facility: CLINIC | Age: 59
End: 2024-10-18

## 2024-11-20 ENCOUNTER — APPOINTMENT (OUTPATIENT)
Dept: ORTHOPEDIC SURGERY | Facility: CLINIC | Age: 59
End: 2024-11-20

## 2024-12-05 ENCOUNTER — APPOINTMENT (OUTPATIENT)
Dept: PHYSICAL MEDICINE AND REHAB | Facility: CLINIC | Age: 59
End: 2024-12-05